# Patient Record
Sex: MALE | Race: OTHER | Employment: OTHER | URBAN - METROPOLITAN AREA
[De-identification: names, ages, dates, MRNs, and addresses within clinical notes are randomized per-mention and may not be internally consistent; named-entity substitution may affect disease eponyms.]

---

## 2018-01-12 ENCOUNTER — HOSPITAL ENCOUNTER (EMERGENCY)
Age: 63
Discharge: HOME | End: 2018-01-12
Attending: EMERGENCY MEDICINE
Payer: COMMERCIAL

## 2018-01-12 ENCOUNTER — APPOINTMENT (OUTPATIENT)
Dept: GENERAL RADIOLOGY | Age: 63
End: 2018-01-12
Attending: EMERGENCY MEDICINE
Payer: COMMERCIAL

## 2018-01-12 VITALS
WEIGHT: 202 LBS | BODY MASS INDEX: 30.62 KG/M2 | HEART RATE: 59 BPM | DIASTOLIC BLOOD PRESSURE: 67 MMHG | TEMPERATURE: 98 F | SYSTOLIC BLOOD PRESSURE: 152 MMHG | OXYGEN SATURATION: 97 % | RESPIRATION RATE: 18 BRPM | HEIGHT: 68 IN

## 2018-01-12 DIAGNOSIS — R07.89 CHEST WALL PAIN: Primary | ICD-10-CM

## 2018-01-12 LAB
ALBUMIN SERPL-MCNC: 3.9 G/DL (ref 3.5–4.8)
ALP LIVER SERPL-CCNC: 75 U/L (ref 45–117)
ALT SERPL-CCNC: 39 U/L (ref 17–63)
AST SERPL-CCNC: 20 U/L (ref 15–41)
ATRIAL RATE: 69 BPM
BASOPHILS # BLD AUTO: 0.03 X10(3) UL (ref 0–0.1)
BASOPHILS NFR BLD AUTO: 0.5 %
BILIRUB SERPL-MCNC: 0.4 MG/DL (ref 0.1–2)
BUN BLD-MCNC: 10 MG/DL (ref 8–20)
CALCIUM BLD-MCNC: 9 MG/DL (ref 8.3–10.3)
CHLORIDE: 104 MMOL/L (ref 101–111)
CO2: 29 MMOL/L (ref 22–32)
CREAT BLD-MCNC: 1.05 MG/DL (ref 0.7–1.3)
D-DIMER: 0.36 UG/ML FEU (ref 0–0.49)
EOSINOPHIL # BLD AUTO: 0.06 X10(3) UL (ref 0–0.3)
EOSINOPHIL NFR BLD AUTO: 0.9 %
ERYTHROCYTE [DISTWIDTH] IN BLOOD BY AUTOMATED COUNT: 13.7 % (ref 11.5–16)
GLUCOSE BLD-MCNC: 185 MG/DL (ref 70–99)
HCT VFR BLD AUTO: 41.6 % (ref 37–53)
HGB BLD-MCNC: 14.3 G/DL (ref 13–17)
IMMATURE GRANULOCYTE COUNT: 0.02 X10(3) UL (ref 0–1)
IMMATURE GRANULOCYTE RATIO %: 0.3 %
LYMPHOCYTES # BLD AUTO: 1.72 X10(3) UL (ref 0.9–4)
LYMPHOCYTES NFR BLD AUTO: 26.5 %
M PROTEIN MFR SERPL ELPH: 7.8 G/DL (ref 6.1–8.3)
MCH RBC QN AUTO: 32.1 PG (ref 27–33.2)
MCHC RBC AUTO-ENTMCNC: 34.4 G/DL (ref 31–37)
MCV RBC AUTO: 93.5 FL (ref 80–99)
MONOCYTES # BLD AUTO: 0.45 X10(3) UL (ref 0.1–0.6)
MONOCYTES NFR BLD AUTO: 6.9 %
NEUTROPHIL ABS PRELIM: 4.21 X10 (3) UL (ref 1.3–6.7)
NEUTROPHILS # BLD AUTO: 4.21 X10(3) UL (ref 1.3–6.7)
NEUTROPHILS NFR BLD AUTO: 64.9 %
P AXIS: 38 DEGREES
P-R INTERVAL: 214 MS
PLATELET # BLD AUTO: 144 10(3)UL (ref 150–450)
POTASSIUM SERPL-SCNC: 3.8 MMOL/L (ref 3.6–5.1)
Q-T INTERVAL: 380 MS
QRS DURATION: 94 MS
QTC CALCULATION (BEZET): 407 MS
R AXIS: -36 DEGREES
RBC # BLD AUTO: 4.45 X10(6)UL (ref 4.3–5.7)
RED CELL DISTRIBUTION WIDTH-SD: 46.8 FL (ref 35.1–46.3)
SODIUM SERPL-SCNC: 138 MMOL/L (ref 136–144)
T AXIS: 2 DEGREES
TROPONIN: <0.046 NG/ML (ref ?–0.05)
VENTRICULAR RATE: 69 BPM
WBC # BLD AUTO: 6.5 X10(3) UL (ref 4–13)

## 2018-01-12 PROCEDURE — 93005 ELECTROCARDIOGRAM TRACING: CPT

## 2018-01-12 PROCEDURE — 85378 FIBRIN DEGRADE SEMIQUANT: CPT | Performed by: EMERGENCY MEDICINE

## 2018-01-12 PROCEDURE — 36415 COLL VENOUS BLD VENIPUNCTURE: CPT

## 2018-01-12 PROCEDURE — 93010 ELECTROCARDIOGRAM REPORT: CPT

## 2018-01-12 PROCEDURE — 99285 EMERGENCY DEPT VISIT HI MDM: CPT

## 2018-01-12 PROCEDURE — 71045 X-RAY EXAM CHEST 1 VIEW: CPT | Performed by: EMERGENCY MEDICINE

## 2018-01-12 PROCEDURE — 84484 ASSAY OF TROPONIN QUANT: CPT | Performed by: EMERGENCY MEDICINE

## 2018-01-12 PROCEDURE — 80053 COMPREHEN METABOLIC PANEL: CPT | Performed by: EMERGENCY MEDICINE

## 2018-01-12 PROCEDURE — 85025 COMPLETE CBC W/AUTO DIFF WBC: CPT | Performed by: EMERGENCY MEDICINE

## 2018-01-12 RX ORDER — ATENOLOL 50 MG/1
50 TABLET ORAL 2 TIMES DAILY
COMMUNITY

## 2018-01-12 RX ORDER — ROSUVASTATIN CALCIUM 10 MG/1
10 TABLET, COATED ORAL NIGHTLY
COMMUNITY

## 2018-01-12 RX ORDER — ESOMEPRAZOLE MAGNESIUM 40 MG/1
40 CAPSULE, DELAYED RELEASE ORAL
COMMUNITY

## 2018-01-12 RX ORDER — LISINOPRIL 20 MG/1
20 TABLET ORAL DAILY
COMMUNITY

## 2018-01-12 RX ORDER — BACLOFEN 10 MG/1
10 TABLET ORAL 2 TIMES DAILY
COMMUNITY

## 2018-01-12 RX ORDER — DICLOFENAC POTASSIUM 50 MG/1
50 TABLET, FILM COATED ORAL 2 TIMES DAILY
COMMUNITY

## 2018-01-12 NOTE — ED PROVIDER NOTES
Patient Seen in: [facility] Emergency Department In [facility]    History   Patient presents with:  Chest Pain Angina (cardiovascular)    Stated Complaint: chest pain    HPI    This is a pleasant 15-year-old male who presents to the ER complaining of having BMI 30.71 kg/m²         Physical Exam  General: This a pleasant nontoxic appearing patient in no apparent distress alert and oriented ×3  HEENT: Pupils are equal reactive to light. Extra ocular motions are intact.   No scleral icterus or conjunctival pallo ---------                               -----------         ------                     CBC W/ DIFFERENTIAL[[id number]]          Abnormal            Final result                 Please view results for these tests on the individual orders.    RAIN pm    Follow-up:  German Key MD  [address] [place] [address] [address] [address]  [phone]    Schedule an appointment as soon as possible for a visit in 3 days          Medications Prescribed:  Current Discharge Medication List

## 2019-09-07 ENCOUNTER — HOSPITAL ENCOUNTER (EMERGENCY)
Age: 64
Discharge: HOME | End: 2019-09-08
Attending: EMERGENCY MEDICINE
Payer: MEDICARE

## 2019-09-07 ENCOUNTER — APPOINTMENT (OUTPATIENT)
Dept: CT IMAGING | Age: 64
End: 2019-09-07
Attending: EMERGENCY MEDICINE
Payer: MEDICARE

## 2019-09-07 ENCOUNTER — APPOINTMENT (OUTPATIENT)
Dept: GENERAL RADIOLOGY | Age: 64
End: 2019-09-07
Payer: MEDICARE

## 2019-09-07 VITALS
OXYGEN SATURATION: 99 % | HEART RATE: 60 BPM | SYSTOLIC BLOOD PRESSURE: 138 MMHG | WEIGHT: 204 LBS | HEIGHT: 68 IN | TEMPERATURE: 98 F | DIASTOLIC BLOOD PRESSURE: 66 MMHG | RESPIRATION RATE: 18 BRPM | BODY MASS INDEX: 30.92 KG/M2

## 2019-09-07 DIAGNOSIS — R07.89 CHEST PAIN, ATYPICAL: Primary | ICD-10-CM

## 2019-09-07 DIAGNOSIS — R16.0 LIVER MASS: ICD-10-CM

## 2019-09-07 LAB
ALBUMIN SERPL-MCNC: 4 G/DL (ref 3.4–5)
ALBUMIN/GLOB SERPL: 1.2 {RATIO} (ref 1–2)
ALP LIVER SERPL-CCNC: 66 U/L (ref 45–117)
ALT SERPL-CCNC: 34 U/L (ref 16–61)
ANION GAP SERPL CALC-SCNC: 7 MMOL/L (ref 0–18)
APTT PPP: 28.9 SECONDS (ref 25.4–36.1)
AST SERPL-CCNC: 17 U/L (ref 15–37)
BASOPHILS # BLD AUTO: 0.02 X10(3) UL (ref 0–0.2)
BASOPHILS NFR BLD AUTO: 0.3 %
BILIRUB SERPL-MCNC: 0.3 MG/DL (ref 0.1–2)
BUN BLD-MCNC: 17 MG/DL (ref 7–18)
BUN/CREAT SERPL: 14.9 (ref 10–20)
CALCIUM BLD-MCNC: 8.8 MG/DL (ref 8.5–10.1)
CHLORIDE SERPL-SCNC: 107 MMOL/L (ref 98–112)
CO2 SERPL-SCNC: 25 MMOL/L (ref 21–32)
CREAT BLD-MCNC: 1.14 MG/DL (ref 0.7–1.3)
D-DIMER: 0.33 UG/ML FEU (ref ?–0.65)
DEPRECATED RDW RBC AUTO: 48.2 FL (ref 35.1–46.3)
EOSINOPHIL # BLD AUTO: 0.1 X10(3) UL (ref 0–0.7)
EOSINOPHIL NFR BLD AUTO: 1.5 %
ERYTHROCYTE [DISTWIDTH] IN BLOOD BY AUTOMATED COUNT: 14 % (ref 11–15)
GLOBULIN PLAS-MCNC: 3.4 G/DL (ref 2.8–4.4)
GLUCOSE BLD-MCNC: 111 MG/DL (ref 70–99)
HCT VFR BLD AUTO: 38.2 % (ref 39–53)
HGB BLD-MCNC: 13.1 G/DL (ref 13–17.5)
IMM GRANULOCYTES # BLD AUTO: 0.02 X10(3) UL (ref 0–1)
IMM GRANULOCYTES NFR BLD: 0.3 %
INR BLD: 1 (ref 0.9–1.1)
LYMPHOCYTES # BLD AUTO: 2.45 X10(3) UL (ref 1–4)
LYMPHOCYTES NFR BLD AUTO: 36.7 %
M PROTEIN MFR SERPL ELPH: 7.4 G/DL (ref 6.4–8.2)
MCH RBC QN AUTO: 32 PG (ref 26–34)
MCHC RBC AUTO-ENTMCNC: 34.3 G/DL (ref 31–37)
MCV RBC AUTO: 93.4 FL (ref 80–100)
MONOCYTES # BLD AUTO: 0.58 X10(3) UL (ref 0.1–1)
MONOCYTES NFR BLD AUTO: 8.7 %
NEUTROPHILS # BLD AUTO: 3.51 X10 (3) UL (ref 1.5–7.7)
NEUTROPHILS # BLD AUTO: 3.51 X10(3) UL (ref 1.5–7.7)
NEUTROPHILS NFR BLD AUTO: 52.5 %
OSMOLALITY SERPL CALC.SUM OF ELEC: 290 MOSM/KG (ref 275–295)
PLATELET # BLD AUTO: 138 10(3)UL (ref 150–450)
POTASSIUM SERPL-SCNC: 4.1 MMOL/L (ref 3.5–5.1)
PSA SERPL DL<=0.01 NG/ML-MCNC: 13.3 SECONDS (ref 12.2–14.4)
RBC # BLD AUTO: 4.09 X10(6)UL (ref 3.8–5.8)
SODIUM SERPL-SCNC: 139 MMOL/L (ref 136–145)
TROPONIN I SERPL-MCNC: <0.045 NG/ML (ref ?–0.04)
WBC # BLD AUTO: 6.7 X10(3) UL (ref 4–11)

## 2019-09-07 PROCEDURE — 85025 COMPLETE CBC W/AUTO DIFF WBC: CPT | Performed by: EMERGENCY MEDICINE

## 2019-09-07 PROCEDURE — 93005 ELECTROCARDIOGRAM TRACING: CPT

## 2019-09-07 PROCEDURE — 96360 HYDRATION IV INFUSION INIT: CPT

## 2019-09-07 PROCEDURE — 96361 HYDRATE IV INFUSION ADD-ON: CPT

## 2019-09-07 PROCEDURE — 71045 X-RAY EXAM CHEST 1 VIEW: CPT | Performed by: EMERGENCY MEDICINE

## 2019-09-07 PROCEDURE — 85730 THROMBOPLASTIN TIME PARTIAL: CPT | Performed by: EMERGENCY MEDICINE

## 2019-09-07 PROCEDURE — 85610 PROTHROMBIN TIME: CPT | Performed by: EMERGENCY MEDICINE

## 2019-09-07 PROCEDURE — 84484 ASSAY OF TROPONIN QUANT: CPT | Performed by: EMERGENCY MEDICINE

## 2019-09-07 PROCEDURE — 99285 EMERGENCY DEPT VISIT HI MDM: CPT

## 2019-09-07 PROCEDURE — 71275 CT ANGIOGRAPHY CHEST: CPT | Performed by: EMERGENCY MEDICINE

## 2019-09-07 PROCEDURE — 93010 ELECTROCARDIOGRAM REPORT: CPT

## 2019-09-07 PROCEDURE — 80053 COMPREHEN METABOLIC PANEL: CPT

## 2019-09-07 PROCEDURE — 84484 ASSAY OF TROPONIN QUANT: CPT

## 2019-09-07 PROCEDURE — 85025 COMPLETE CBC W/AUTO DIFF WBC: CPT

## 2019-09-07 PROCEDURE — 80053 COMPREHEN METABOLIC PANEL: CPT | Performed by: EMERGENCY MEDICINE

## 2019-09-07 PROCEDURE — 85379 FIBRIN DEGRADATION QUANT: CPT | Performed by: EMERGENCY MEDICINE

## 2019-09-07 RX ORDER — SODIUM CHLORIDE 9 MG/ML
INJECTION, SOLUTION INTRAVENOUS CONTINUOUS
Status: DISCONTINUED | OUTPATIENT
Start: 2019-09-07 | End: 2019-09-08

## 2019-09-07 RX ORDER — ALPRAZOLAM 0.25 MG/1
0.25 TABLET ORAL 3 TIMES DAILY PRN
Qty: 20 TABLET | Refills: 0 | Status: SHIPPED | OUTPATIENT
Start: 2019-09-07 | End: 2019-09-14

## 2019-09-07 RX ORDER — ASPIRIN 81 MG/1
324 TABLET, CHEWABLE ORAL ONCE
Status: DISCONTINUED | OUTPATIENT
Start: 2019-09-07 | End: 2019-09-08

## 2019-09-08 LAB
ATRIAL RATE: 57 BPM
P AXIS: 33 DEGREES
P-R INTERVAL: 230 MS
Q-T INTERVAL: 448 MS
QRS DURATION: 96 MS
QTC CALCULATION (BEZET): 436 MS
R AXIS: -34 DEGREES
T AXIS: 4 DEGREES
VENTRICULAR RATE: 57 BPM

## 2019-09-08 NOTE — ED PROVIDER NOTES
Patient Seen in: [facility] Emergency Department In [place]    History   Patient presents with:  Chest Pain Angina (cardiovascular)    Stated Complaint: chest pain off/on since this morning    HPI    Complains of intermittent momentary sharp pains to the or pallor. No rashes. HEENT: No scleral icterus. Oropharynx moist  Neck: Supple without adenopathy. Thyroid not palpable. Carotid arteries 2+ and equal bilaterally without bruits  Lungs: Clear  Chest: No tenderness. No rashes.   Heart: Apical pulse 60 changes. Ct Angiography, Chest (cpt=71275)    Result Date: 9/7/2019  CONCLUSION:  1. No pulmonary embolism or acute chest pathology.  2. Posterior right hepatic mass is not completely evaluated on this noncontrast CT chest.  Recommend additional imaging

## 2019-09-08 NOTE — ED INITIAL ASSESSMENT (HPI)
Pt c/o intermittent \"sharp\" pains to the left side of his chest since this morning. No cortney or nausea. No recent travel or hx of PE's.

## 2024-04-16 ENCOUNTER — APPOINTMENT (OUTPATIENT)
Dept: URBAN - METROPOLITAN AREA CLINIC 247 | Age: 69
Setting detail: DERMATOLOGY
End: 2024-04-17

## 2024-04-16 DIAGNOSIS — L98.8 OTHER SPECIFIED DISORDERS OF THE SKIN AND SUBCUTANEOUS TISSUE: ICD-10-CM

## 2024-04-16 DIAGNOSIS — L82.0 INFLAMED SEBORRHEIC KERATOSIS: ICD-10-CM

## 2024-04-16 PROCEDURE — 99202 OFFICE O/P NEW SF 15 MIN: CPT | Mod: 25

## 2024-04-16 PROCEDURE — 17110 DESTRUCT B9 LESION 1-14: CPT

## 2024-04-16 PROCEDURE — OTHER LIQUID NITROGEN: OTHER

## 2024-04-16 PROCEDURE — OTHER COUNSELING: OTHER

## 2024-04-16 PROCEDURE — OTHER DEFER: OTHER

## 2024-04-16 ASSESSMENT — LOCATION DETAILED DESCRIPTION DERM
LOCATION DETAILED: LEFT SUPERIOR FRONTAL SCALP
LOCATION DETAILED: RIGHT SUPERIOR OCCIPITAL SCALP

## 2024-04-16 ASSESSMENT — LOCATION SIMPLE DESCRIPTION DERM
LOCATION SIMPLE: SCALP
LOCATION SIMPLE: RIGHT OCCIPITAL SCALP

## 2024-04-16 ASSESSMENT — LOCATION ZONE DERM: LOCATION ZONE: SCALP

## 2024-04-16 NOTE — PROCEDURE: LIQUID NITROGEN
Include Z78.9 (Other Specified Conditions Influencing Health Status) As An Associated Diagnosis?: No
Show Aperture Variable?: Yes
Post-Care Instructions: I reviewed with the patient in detail post-care instructions. Patient is to wear sunprotection, and avoid picking at any of the treated lesions. Pt may apply Vaseline to crusted or scabbing areas.
Medical Necessity Clause: This procedure was medically necessary because the lesions that were treated were:
Spray Paint Text: The liquid nitrogen was applied to the skin utilizing a spray paint frosting technique.
Medical Necessity Information: It is in your best interest to select a reason for this procedure from the list below. All of these items fulfill various CMS LCD requirements except the new and changing color options.
Detail Level: Detailed
Consent: The patient's consent was obtained including but not limited to risks of crusting, scabbing, blistering, scarring, darker or lighter pigmentary change, recurrence, incomplete removal and infection.

## 2024-04-16 NOTE — PROCEDURE: DEFER
Introduction Text (Please End With A Colon): The following procedure was deferred: incision and drainage
Detail Level: Simple
Reason To Defer Override: pt will think about removal and call if interested for removal (excision with surgeon)
Size Of Lesion In Cm (Optional): 0.7
X Size Of Lesion In Cm (Optional): 0

## 2025-02-11 ENCOUNTER — HOSPITAL ENCOUNTER (EMERGENCY)
Age: 70
Discharge: HOME | End: 2025-02-11
Attending: EMERGENCY MEDICINE
Payer: MEDICARE

## 2025-02-11 VITALS
HEART RATE: 64 BPM | RESPIRATION RATE: 18 BRPM | SYSTOLIC BLOOD PRESSURE: 120 MMHG | TEMPERATURE: 98 F | OXYGEN SATURATION: 99 % | DIASTOLIC BLOOD PRESSURE: 74 MMHG | HEIGHT: 68 IN | BODY MASS INDEX: 29.86 KG/M2 | WEIGHT: 197 LBS

## 2025-02-11 DIAGNOSIS — R33.9 URINARY RETENTION: Primary | ICD-10-CM

## 2025-02-11 LAB
BILIRUB UR QL STRIP.AUTO: NEGATIVE
CLARITY UR REFRACT.AUTO: CLEAR
COLOR UR AUTO: YELLOW
GLUCOSE UR STRIP.AUTO-MCNC: NEGATIVE MG/DL
KETONES UR STRIP.AUTO-MCNC: NEGATIVE MG/DL
LEUKOCYTE ESTERASE UR QL STRIP.AUTO: NEGATIVE
NITRITE UR QL STRIP.AUTO: NEGATIVE
PH UR STRIP.AUTO: 5.5 [PH] (ref 5–8)
PROT UR STRIP.AUTO-MCNC: NEGATIVE MG/DL
SP GR UR STRIP.AUTO: 1.02 (ref 1–1.03)
UROBILINOGEN UR STRIP.AUTO-MCNC: 0.2 MG/DL

## 2025-02-11 PROCEDURE — 81001 URINALYSIS AUTO W/SCOPE: CPT | Performed by: EMERGENCY MEDICINE

## 2025-02-11 PROCEDURE — 51702 INSERT TEMP BLADDER CATH: CPT

## 2025-02-11 PROCEDURE — 81015 MICROSCOPIC EXAM OF URINE: CPT | Performed by: EMERGENCY MEDICINE

## 2025-02-11 PROCEDURE — 99283 EMERGENCY DEPT VISIT LOW MDM: CPT

## 2025-02-11 PROCEDURE — 99284 EMERGENCY DEPT VISIT MOD MDM: CPT

## 2025-02-11 RX ORDER — METFORMIN HYDROCHLORIDE EXTENDED-RELEASE TABLETS 500 MG/1
500 TABLET, FILM COATED, EXTENDED RELEASE ORAL
COMMUNITY

## 2025-02-11 RX ORDER — LIDOCAINE HYDROCHLORIDE 20 MG/ML
5 JELLY TOPICAL ONCE
Status: COMPLETED | OUTPATIENT
Start: 2025-02-11 | End: 2025-02-11

## 2025-02-11 RX ORDER — TAMSULOSIN HYDROCHLORIDE 0.4 MG/1
0.4 CAPSULE ORAL DAILY
Qty: 7 CAPSULE | Refills: 0 | Status: SHIPPED | OUTPATIENT
Start: 2025-02-11 | End: 2025-02-14

## 2025-02-11 NOTE — ED PROVIDER NOTES
Patient Seen in: [facility] In [place]      History     Chief Complaint   Patient presents with    Urinary Symptoms     Stated Complaint: urinary retention for 12 hours    Subjective:   HPI      69-year-old with a history of GERD, hypertension, high cholesterol, prostatitis in  presents with his daughter for evaluation of urinary retention.  Some of the history is provided by his daughter.  Patient states that he has not urinated since 1 PM yesterday.  Feels uncomfortable now.  Did not have abdominal pain or flank pain preceding this.  No hematuria dysuria urgency or frequency.  No fever.  No nausea or vomiting.  No midline back pain or weakness or numbness of the lower extremities.  Denies new medications or recent surgery.  Outpatient records reviewed.  Hemoglobin A1c was 6.3 in January.  Objective:     Past Medical History:    Esophageal reflux    Essential hypertension    Hyperlipidemia              History reviewed. No pertinent surgical history.             Social History     Socioeconomic History    Marital status:    Tobacco Use    Smoking status: Never    Smokeless tobacco: Never   Vaping Use    Vaping status: Never Used   Substance and Sexual Activity    Alcohol use: Never    Drug use: Never     Social Drivers of Health     Food Insecurity: Not at Risk (2024)    Received from [facility]    Food Insecurity     Food: 1   Transportation Needs: Not at Risk (2025)    Received from [facility]    Transportation Needs     Transportation: 1   Housing Stability: Not at Risk (2025)    Received from [facility]    Housing Stability     Housin                  Physical Exam     ED Triage Vitals [25 0731]   /79   Pulse 66   Resp 18   Temp 97.9 °F (36.6 °C)   Temp src Temporal   SpO2 98 %   O2 Device None (Room air)       Current Vitals:   Vital Signs  BP: 157/79  Pulse: 66  Resp: 18  Temp: 97.9 °F (36.6 °C)  Temp src: Temporal    Oxygen Therapy  SpO2: 98 %  O2 Device: None  (Room air)        Physical Exam  General: Patient is awake, alert in no acute distress.   HEENT:  Sclera are not icteric.  Conjunctivae within normal limits.  Mucous members are moist.   Cardiovascular: Regular rate and rhythm, normal S1-S2.  Respiratory: Lungs are clear to auscultation bilaterally.   Abdomen: Soft, tender over a distended bladder in the lower abdomen, nondistended.  Extremities: No edema.  Skin: warm and dry, no diaphoresis  Neuro: Normal strength and sensation bilateral lower extremities  ED Course     Labs Reviewed   URINALYSIS WITH CULTURE REFLEX - Abnormal; Notable for the following components:       Result Value    Blood Urine Trace-lysed (*)     All other components within normal limits   UA MICROSCOPIC ONLY, URINE - Abnormal; Notable for the following components:    RBC Urine 3-5 (*)     All other components within normal limits            Chan catheter placed  1200cc clear urine drained     MDM      History is obtained from: Daughter    Previous records reviewed as noted in HPI    Differential includes, but is not limited to, pyelonephritis, cauda equina, BPH    Review of any laboratory testing: Urinalysis without evidence of infection     Shared decision making with the patient.  Patient will be discharged with Chan catheter in place.  Nothing to suggest cauda equina or pyelonephritis.  May have underlying BPH can start Flomax while awaiting urology follow-up.  Return precautions given.    The administration of these medications were addressed : Flomax                             Medical Decision Making      Disposition and Plan     Clinical Impression:  1. Urinary retention         Disposition:  Discharge  2/11/2025  8:50 am    Follow-up:  [facility], [address] [place] [id number] [phone]  Schedule an appointment as soon as possible for a visit in 3 day(s)            Medications Prescribed:  Current Discharge  Medication List        START taking these medications    Details   tamsulosin (FLOMAX) 0.4 MG Oral Cap Take 1 capsule (0.4 mg total) by mouth daily for 7 days.  Qty: 7 capsule, Refills: 0                 Supplementary Documentation:

## 2025-02-12 ENCOUNTER — TELEPHONE (OUTPATIENT)
Dept: SURGERY | Facility: CLINIC | Age: 70
End: 2025-02-12

## 2025-02-12 NOTE — TELEPHONE ENCOUNTER
Patient daughter calling stating patient needs to schedule an ER follow up was told to follow up by 2/14  per patient daughter stating he had a catheter inserted yesterday. No openings available would prefer [place] location. Per daughter patient has an HMO insurance but patient will see his primary doctor to get referral.

## 2025-02-13 ENCOUNTER — HOSPITAL ENCOUNTER (EMERGENCY)
Age: 70
Discharge: HOME | End: 2025-02-13
Attending: STUDENT IN AN ORGANIZED HEALTH CARE EDUCATION/TRAINING PROGRAM
Payer: MEDICARE

## 2025-02-13 VITALS
TEMPERATURE: 98 F | OXYGEN SATURATION: 97 % | HEART RATE: 96 BPM | SYSTOLIC BLOOD PRESSURE: 145 MMHG | RESPIRATION RATE: 19 BRPM | DIASTOLIC BLOOD PRESSURE: 80 MMHG

## 2025-02-13 DIAGNOSIS — T83.011A MALFUNCTION OF FOLEY CATHETER, INITIAL ENCOUNTER: Primary | ICD-10-CM

## 2025-02-13 LAB
BILIRUB UR QL STRIP.AUTO: NEGATIVE
CLARITY UR REFRACT.AUTO: CLEAR
COLOR UR AUTO: YELLOW
GLUCOSE UR STRIP.AUTO-MCNC: 500 MG/DL
KETONES UR STRIP.AUTO-MCNC: NEGATIVE MG/DL
LEUKOCYTE ESTERASE UR QL STRIP.AUTO: NEGATIVE
NITRITE UR QL STRIP.AUTO: NEGATIVE
PH UR STRIP.AUTO: 5.5 [PH] (ref 5–8)
RBC #/AREA URNS AUTO: >10 /HPF
RBC #/AREA URNS AUTO: >10 /HPF
SP GR UR STRIP.AUTO: 1.02 (ref 1–1.03)
UROBILINOGEN UR STRIP.AUTO-MCNC: 0.2 MG/DL

## 2025-02-13 PROCEDURE — 99283 EMERGENCY DEPT VISIT LOW MDM: CPT

## 2025-02-13 PROCEDURE — 81015 MICROSCOPIC EXAM OF URINE: CPT | Performed by: STUDENT IN AN ORGANIZED HEALTH CARE EDUCATION/TRAINING PROGRAM

## 2025-02-13 PROCEDURE — 81001 URINALYSIS AUTO W/SCOPE: CPT | Performed by: STUDENT IN AN ORGANIZED HEALTH CARE EDUCATION/TRAINING PROGRAM

## 2025-02-13 RX ORDER — LIDOCAINE HYDROCHLORIDE 20 MG/ML
5 JELLY TOPICAL ONCE
Status: COMPLETED | OUTPATIENT
Start: 2025-02-13 | End: 2025-02-13

## 2025-02-13 NOTE — ED PROVIDER NOTES
History     Chief Complaint   Patient presents with    Cath Tube Problem       HPI    69 year old male presents with Chan catheter malfunction.  Patient was seen in this ER 2/11 with urinary retention, had a Chan catheter placed and started on Flomax.  Patient states since about 3 PM yesterday he has not noted any drainage of urine into the bag.  He is endorsing suprapubic discomfort.  No other acute symptoms.          Past Medical History:    Esophageal reflux    Essential hypertension    Hyperlipidemia       No past surgical history on file.    No pertinent social history.                 Physical Exam     ED Triage Vitals [02/13/25 0308]   /80   Pulse 96   Resp 19   Temp 98.2 °F (36.8 °C)   Temp src Oral   SpO2 97 %   O2 Device None (Room air)       Physical Exam  Constitutional:       General: He is not in acute distress.  Eyes:      Extraocular Movements: Extraocular movements intact.   Cardiovascular:      Rate and Rhythm: Normal rate.      Pulses: Normal pulses.   Pulmonary:      Effort: Pulmonary effort is normal. No respiratory distress.   Abdominal:      General: There is distension.      Tenderness: There is abdominal tenderness.   Genitourinary:     Comments: Chan catheter appears to be in place  Musculoskeletal:      Cervical back: Normal range of motion.   Neurological:      General: No focal deficit present.      Mental Status: He is alert.              ED Course     Labs Reviewed   URINALYSIS WITH CULTURE REFLEX - Abnormal; Notable for the following components:       Result Value    Glucose Urine 500 (*)     Blood Urine Large (*)     Protein Urine 30 mg/dL (*)     RBC Urine >10 (*)     All other components within normal limits   UA MICROSCOPIC ONLY, URINE - Abnormal; Notable for the following components:    RBC Urine >10 (*)     All other components within normal limits     No results found.        MDM     Vitals:    02/13/25 0308   BP: 145/80   Pulse: 96   Resp: 19   Temp: 98.2 °F (36.8  °C)   TempSrc: Oral   SpO2: 97%       Chan catheter obstruction, malfunction.  Possible cystitis or worsening bph.  Attempted flushing Chan catheter, without success.  Will replace Chan catheter      ED Course as of 02/13/25 0432  ------------------------------------------------------------  Time: 02/13 0416  Comment: Chan catheter replaced successfully.  Large volume urine freely flowing and patient is more comfortable.  Patient will be discharged home, continue current therapy.  Unfortunately urinalysis chemistry machine is down at this time, will follow-up urinalysis and call in any necessary antibiotics.     UA without evidence of infx.      Disposition and Plan     Clinical Impression:  1. Malfunction of Chan catheter, initial encounter        Disposition:  Discharge    Follow-up:  urology    Follow up        Medications Prescribed:  Current Discharge Medication List

## 2025-02-14 ENCOUNTER — OFFICE VISIT (OUTPATIENT)
Dept: SURGERY | Facility: CLINIC | Age: 70
End: 2025-02-14

## 2025-02-14 DIAGNOSIS — R97.20 ELEVATED PSA: ICD-10-CM

## 2025-02-14 DIAGNOSIS — N40.1 BENIGN LOCALIZED PROSTATIC HYPERPLASIA WITH LOWER URINARY TRACT SYMPTOMS (LUTS): Primary | ICD-10-CM

## 2025-02-14 DIAGNOSIS — R33.9 URINARY RETENTION: ICD-10-CM

## 2025-02-14 PROCEDURE — 1160F RVW MEDS BY RX/DR IN RCRD: CPT | Performed by: UROLOGY

## 2025-02-14 PROCEDURE — 1159F MED LIST DOCD IN RCRD: CPT | Performed by: UROLOGY

## 2025-02-14 PROCEDURE — 99204 OFFICE O/P NEW MOD 45 MIN: CPT | Performed by: UROLOGY

## 2025-02-14 RX ORDER — TAMSULOSIN HYDROCHLORIDE 0.4 MG/1
0.4 CAPSULE ORAL DAILY
Qty: 30 CAPSULE | Refills: 5 | Status: SHIPPED | OUTPATIENT
Start: 2025-02-14 | End: 2025-08-13

## 2025-02-14 RX ORDER — TAMSULOSIN HYDROCHLORIDE 0.4 MG/1
0.4 CAPSULE ORAL DAILY
Qty: 30 CAPSULE | Refills: 0 | Status: SHIPPED | OUTPATIENT
Start: 2025-02-14 | End: 2025-02-14

## 2025-02-14 NOTE — PROGRESS NOTES
Urology Clinic Note - New Patient    Referring Provider:  No referring provider defined for this encounter.     Primary Care Provider:  Rosie Salgado APRN     Chief Complaint:   Urinary retention     HPI:     Dean Domnigo is a 69 year old male with history of GERD, hypertension, CAD on aspirin 81 only (error in chart), hyperlipidemia referred for urinary retention.   offered, patient did not want.    Patient has no urologic history aside from possible prostatitis in 2013.  He presented to the emergency room on 2/11/2025 with urinary retention for 1 day.  He had abdominal pain at that time.  He had a catheter placed.  He apparently had over 1 L drained.  Urine was not consistent with infection but did have some microscopic hematuria.  No imaging was done at that time, he was discharged with catheter.  He now presents for evaluation.  Recent labs reviewed, recent A1c was 6.3.  Patient has been on Flomax since discharge.  Of note, he was in the emergency room yesterday morning for catheter malfunction.  Catheter was unable to be flushed and was replaced.  Has been draining fine since that time.  At baseline, patient reports he reports slightly slowed urinary stream over the last few years.  Again, has never previously seen urology.  He is currently tolerating Chan fine.  His bowels are regular.  No previous history of retention.  No recent medication changes.  No recent travel.  He was given Flomax for 1 week.  Has a few tablets left.    PSA was done at OSH, 4/29/24- 5.3.       PSA:  No results found for: \"PSA\", \"PERCENTPSA\", \"PSAS\", \"PSAULTRA\", \"QPSA\", \"PSATOT\", \"TOTPSADX\", \"TOTPSASCREEN\"   Last Cr was 1.14 done on 9/7/2019.  Last GFR (PEDRO) was 67 done on 9/7/2019.      History:     Past Medical History:    Esophageal reflux    Essential hypertension    Hyperlipidemia       No past surgical history on file.    No family history on file.    Social History     Socioeconomic History    Marital status:     Tobacco Use    Smoking status: Never    Smokeless tobacco: Never   Vaping Use    Vaping status: Never Used   Substance and Sexual Activity    Alcohol use: Never    Drug use: Never     Social Drivers of Health     Food Insecurity: Not at Risk (2024)    Received from [facility]    Food Insecurity     Food: 1   Transportation Needs: Not at Risk (2025)    Received from [facility]    Transportation Needs     Transportation: 1   Stress: Not at Risk (2025)    Received from [facility]    Stress     Stress: 1   Housing Stability: Not at Risk (2025)    Received from [facility]    Housing Stability     Housin       Medications (Active prior to today's visit):  Current Outpatient Medications   Medication Sig Dispense Refill    metFORMIN HCl ER, OSM, 500 MG (OSM) Oral Tablet 24 Hr Take 1 tablet (500 mg total) by mouth daily with breakfast.      tamsulosin (FLOMAX) 0.4 MG Oral Cap Take 1 capsule (0.4 mg total) by mouth daily for 7 days. 7 capsule 0    Diclofenac Potassium 50 MG Oral Tab Take 1 tablet (50 mg total) by mouth 2 (two) times daily.      baclofen 10 MG Oral Tab Take 1 tablet (10 mg total) by mouth 2 (two) times daily.      Esomeprazole Magnesium 40 MG Oral Capsule Delayed Release Take 1 capsule (40 mg total) by mouth every morning before breakfast.      aspirin 325 MG Oral Tab EC Take 1 tablet (325 mg total) by mouth every 6 (six) hours as needed for Pain.      atenolol 50 MG Oral Tab Take 1 tablet (50 mg total) by mouth 2 (two) times daily.      Rosuvastatin Calcium 10 MG Oral Tab Take 1 tablet (10 mg total) by mouth nightly.      lisinopril 20 MG Oral Tab Take 1 tablet (20 mg total) by mouth daily.         Allergies:  Allergies[1]      Review of Systems:   A comprehensive 10-point review of systems was completed.  Pertinent positives and negatives are noted in the the HPI.    Physical Exam:   CONSTITUTIONAL: Well developed, well nourished, in no acute distress  NEUROLOGIC: Alert and oriented  HEAD:  Normocephalic, atraumatic  ENT: Hearing intact   RESPIRATORY: Normal respiratory effort  SKIN: No evident rashes  ABDOMEN: Soft, non-tender, non-distended,  Chan in place with yellow      Assessment & Plan:       Urinary retention  Discussed potential etiologies.  No previous imaging.  No previous urologic workup done.  Will continue Flomax for now.  Given his LUTS that have been developing over the last several years we discussed the option to proceed with cystoscopy and void trial in the same day.  He would like to do this.  Will consider finasteride pending size of prostate.  If unable to void may also double dose of Flomax at that time.  Flomax refilled today  CT stone ordered for prostate sizing, checking for other potential causes of retention    Elevated PSA  Discussed this in detail today.  Patient needs a recheck PSA.  We will wait for his catheter to come out before doing this.  If any concerns in the future or if bladder outlet obstruction procedures plan we may proceed with an MRI before.      Thank you for this consult.    I have personally reviewed all relevant medical records, labs, and imaging.           Hi Costa MD  Staff Urologist  [facility]  Office: [phone]           [1] No Known Allergies

## 2025-02-17 RX ORDER — TAMSULOSIN HYDROCHLORIDE 0.4 MG/1
0.4 CAPSULE ORAL
Qty: 90 CAPSULE | Refills: 0 | OUTPATIENT
Start: 2025-02-17

## 2025-02-20 ENCOUNTER — HOSPITAL ENCOUNTER (EMERGENCY)
Age: 70
Discharge: HOME | End: 2025-02-20
Attending: EMERGENCY MEDICINE
Payer: MEDICARE

## 2025-02-20 ENCOUNTER — PROCEDURE (OUTPATIENT)
Facility: LOCATION | Age: 70
End: 2025-02-20
Payer: MEDICARE

## 2025-02-20 ENCOUNTER — NURSE ONLY (OUTPATIENT)
Facility: LOCATION | Age: 70
End: 2025-02-20
Payer: MEDICARE

## 2025-02-20 VITALS
SYSTOLIC BLOOD PRESSURE: 187 MMHG | TEMPERATURE: 98 F | HEIGHT: 68 IN | OXYGEN SATURATION: 94 % | RESPIRATION RATE: 20 BRPM | BODY MASS INDEX: 29.4 KG/M2 | HEART RATE: 69 BPM | DIASTOLIC BLOOD PRESSURE: 95 MMHG | WEIGHT: 194 LBS

## 2025-02-20 DIAGNOSIS — R33.9 URINARY RETENTION: ICD-10-CM

## 2025-02-20 DIAGNOSIS — R97.20 ELEVATED PSA: ICD-10-CM

## 2025-02-20 DIAGNOSIS — N40.1 BENIGN LOCALIZED PROSTATIC HYPERPLASIA WITH LOWER URINARY TRACT SYMPTOMS (LUTS): Primary | ICD-10-CM

## 2025-02-20 DIAGNOSIS — R33.9 URINARY RETENTION: Primary | ICD-10-CM

## 2025-02-20 LAB
BILIRUB UR QL STRIP.AUTO: NEGATIVE
CLARITY UR REFRACT.AUTO: CLEAR
COLOR UR AUTO: YELLOW
GLUCOSE UR STRIP.AUTO-MCNC: 250 MG/DL
KETONES UR STRIP.AUTO-MCNC: NEGATIVE MG/DL
LEUKOCYTE ESTERASE UR QL STRIP.AUTO: NEGATIVE
NITRITE UR QL STRIP.AUTO: NEGATIVE
PH UR STRIP.AUTO: 5 [PH] (ref 5–8)
PROT UR STRIP.AUTO-MCNC: NEGATIVE MG/DL
SP GR UR STRIP.AUTO: <=1.005 (ref 1–1.03)
UROBILINOGEN UR STRIP.AUTO-MCNC: 0.2 MG/DL

## 2025-02-20 PROCEDURE — 99283 EMERGENCY DEPT VISIT LOW MDM: CPT

## 2025-02-20 PROCEDURE — 81015 MICROSCOPIC EXAM OF URINE: CPT | Performed by: EMERGENCY MEDICINE

## 2025-02-20 PROCEDURE — 52000 CYSTOURETHROSCOPY: CPT | Performed by: UROLOGY

## 2025-02-20 PROCEDURE — 81001 URINALYSIS AUTO W/SCOPE: CPT | Performed by: EMERGENCY MEDICINE

## 2025-02-20 PROCEDURE — 99215 OFFICE O/P EST HI 40 MIN: CPT | Performed by: UROLOGY

## 2025-02-20 PROCEDURE — 51702 INSERT TEMP BLADDER CATH: CPT

## 2025-02-20 RX ORDER — LIDOCAINE HYDROCHLORIDE 20 MG/ML
5 JELLY TOPICAL ONCE
Status: COMPLETED | OUTPATIENT
Start: 2025-02-20 | End: 2025-02-20

## 2025-02-20 RX ORDER — TAMSULOSIN HYDROCHLORIDE 0.4 MG/1
0.8 CAPSULE ORAL EVERY EVENING
Qty: 60 CAPSULE | Refills: 2 | Status: SHIPPED | OUTPATIENT
Start: 2025-02-20 | End: 2025-05-21

## 2025-02-20 RX ORDER — SULFAMETHOXAZOLE AND TRIMETHOPRIM 800; 160 MG/1; MG/1
1 TABLET ORAL ONCE
Status: COMPLETED | OUTPATIENT
Start: 2025-02-20 | End: 2025-02-20

## 2025-02-20 RX ORDER — FINASTERIDE 5 MG/1
5 TABLET, FILM COATED ORAL DAILY
Qty: 90 TABLET | Refills: 6 | Status: SHIPPED | OUTPATIENT
Start: 2025-02-20

## 2025-02-20 RX ADMIN — SULFAMETHOXAZOLE AND TRIMETHOPRIM 1 TABLET: 800; 160 TABLET ORAL at 14:57:00

## 2025-02-20 NOTE — PROGRESS NOTES
Clinic Procedure Note    INDICATIONS:      Dean Domingo is a 69 year old male with history of GERD, hypertension, CAD on aspirin 81 only (error in chart), hyperlipidemia referred for urinary retention.   offered, patient did not want.     Patient has no urologic history aside from possible prostatitis in 2013.  He presented to the emergency room on 2/11/2025 with urinary retention for 1 day.  He had abdominal pain at that time.  He had a catheter placed.  He apparently had over 1 L drained.  Urine was not consistent with infection but did have some microscopic hematuria.  No imaging was done at that time, he was discharged with catheter.    He then saw for evaluation.  Recent labs reviewed, recent A1c was 6.3.  Patient has been on Flomax since discharge.   At baseline, patient reports he reports slightly slowed urinary stream over the last few years.  Again, has never previously seen urology.  He is currently tolerating Chan fine.  His bowels are regular.  No previous history of retention.  No recent medication changes.  No recent travel.      PSA was done at OSH, 4/29/24- 5.3.   At last visit, plan was to recheck a PSA in the future.  Discussed that he may need MRI in the future to work this up.  For his urinary retention, we were to discuss adding finasteride.  A CT scan was ordered.  This was not done.  Flomax was refilled.  He was to return for a void trial and cystoscopy    Catheter was removed this morning.  Patient misunderstood instructions, did not realize that he was returning for a postvoid residual and cystoscopy.  He was unable to void.  He went to the emergency room here at [facility] and a Chan was placed with return of 700 mL of fluid  CT scan has not been done yet is ordered    PROCEDURE:       1. Flexible cystourethroscopy    DATE OF PROCEDURE: 2/20/2025     PRE-PROCEDURE DIAGNOSIS: Urinary retention    POST-PROCEDURE DIAGNOSIS: Same     SURGEON: Hi Costa,  MD    FINDINGS:    Urethra: Orthotopic meatus, normal caliber urethra throughout without lesions    Prostate: Moderate trilobar hypertrophy with obstructive lateral lobes, moderate intravesical median lobe with high bladder neck     Bladder: Normal mucosa with no papillary lesions or erythema, mild trabeculation; mild posterior bladder irritation from Chan catheter    Ureteral orifices: Orthotopic, very close to bladder neck     Other findings: None    PROCEDURE:   Patient was brought to the procedure suite and a time-out was performed identifiying the patient,  and procedure to be performed. The risks and benefits of the procedure were once again discussed with the patient including bleeding, infection, and dysuria. The patient agreed to proceed. The patient did not have any signs or symptoms of active UTI.    He was placed in supine position on the table and the penis was prepped and draped in the standard sterile fashion. Urojet was instilled per urethra for local anesthetic effect. A flexible cystoscope was inserted per urethra. The bladder was fully inspected (including retroflexion) and showed findings as above. Both ureteral orifices were orthotopic. The prostate was carefully viewed on removal of the scope, with findings as above. The scope was then carefully removed.    There were no complications and the patient tolerated the procedure well.    IMPRESSION AND PLAN:     Urinary retention    As above.  Unclear etiology of his retention.  He does have slightly worsening constipation recently.  He did not note this last week but has noticed this week.  I told him to start MiraLAX daily.  He may double the dose of his Flomax.  Given the appearance of his prostate we may start finasteride as well.  Side effects were discussed in detail.  I did discuss possible surgical options.  We should further workup his PSA as below before proceeding with surgery.  Will start medications as above and repeat void trial in  1 month, he should again return to see me at that time to discuss surgery.  I also want to discuss CT scan at that time, this is ordered.  He should complete soon.  Does not want to learn [facility]    Hx of elev PSA  As above.  Given ongoing retention and inability to get Chan out we will plan for repeat PSA in 2 weeks.  If remains elevated we will plan for a MRI of his prostate.  Cystoscopy with medially deviated ureteral orifices and closer to the bladder neck, however no other signs of local extension of prostate cancer    In total, 30 additional minutes were spent on this patient encounter (including chart review, patient history, physical, and counseling, documentation, and communication).  Extensive counseling with both patient and family.  Patient did not want ,         Hi Costa MD  Staff Urologist  [facility]  Office: [phone]

## 2025-02-20 NOTE — ED PROVIDER NOTES
Patient Seen in: [facility] [place]      History     Chief Complaint   Patient presents with    Urinary Symptoms     Stated Complaint: had catheter removed today and having urinary retention    Subjective:   HPI      69-year-old male presents for evaluation of inability to urinate since 9 AM this morning.  Patient was seen this morning by urology and had his catheter pulled.  Previous to that he had had the catheter in place for 8 days for urinary retention.  He was here on .  He is in the process of getting an outpatient workup for urinary retention.    Objective:     Past Medical History:    Esophageal reflux    Essential hypertension    Hyperlipidemia              History reviewed. No pertinent surgical history.             Social History     Socioeconomic History    Marital status:    Tobacco Use    Smoking status: Never    Smokeless tobacco: Never   Vaping Use    Vaping status: Never Used   Substance and Sexual Activity    Alcohol use: Never    Drug use: Never     Social Drivers of Health     Food Insecurity: Not at Risk (2024)    Received from [facility]    Food Insecurity     Food: 1   Transportation Needs: Not at Risk (2025)    Received from [facility]    Transportation Needs     Transportation: 1   Housing Stability: Not at Risk (2025)    Received from [facility]    Housing Stability     Housin                  Physical Exam     ED Triage Vitals [25 1350]   BP (!) 187/95   Pulse 69   Resp 20   Temp 97.9 °F (36.6 °C)   Temp src Oral   SpO2 94 %   O2 Device None (Room air)       Current Vitals:   Vital Signs  BP: (!) 187/95  Pulse: 69  Resp: 20  Temp: 97.9 °F (36.6 °C)  Temp src: Oral    Oxygen Therapy  SpO2: 94 %  O2 Device: None (Room air)        Physical Exam  General: Uncomfortable  Neck: Supple  Lungs: Clear to auscultation bilaterally.  Heart: Regular rate and rhythm.  Abdomen: Soft, nontender.  Suprapubic distention  Skin: No rash.  No edema.  Neurologic: No  focal neurologic deficits.  Normal speech pattern  Musculoskeletal: No tenderness or deformity noted.  Full range of motion throughout.        ED Course     Labs Reviewed   URINALYSIS WITH CULTURE REFLEX                   MDM      Differential diagnosis includes UTI versus urinary retention    UA shows blood but no infection    Chan placed with 700 cc of urine obtained.  Patient has an appointment right now with urology.  He was discharged with a leg bag      Medical Decision Making  Amount and/or Complexity of Data Reviewed  External Data Reviewed: notes.     Details: ER note 2/11 and urology note from earlier today        Disposition and Plan     Clinical Impression:  1. Urinary retention         Disposition:  Discharge  2/20/2025  2:07 pm    Follow-up:  Hi Costa MD  [address] [place] [address]  [phone]    Schedule an appointment as soon as possible for a visit            Medications Prescribed:  Current Discharge Medication List              Supplementary Documentation:

## 2025-02-20 NOTE — PROGRESS NOTES
Verified the patient's name, date of birth and his reason for the visit. Steps of the procedure explained. Safely assisted patient to the exam table and put him in a comfortable position. While on standing position, RN asked to lower his pants and undergarments. Blue chux under his buttocks. Draped the private area.  Penile area observed to be clean and pink. No redness or swelling.     Donned gloves. Removed the straps and the leg bag. Urine was yellow and no sedimentation observed.    Chux on patient's thigh. With the empty syringe, deflated the balloon of his entire content of 10cc. Chan removed slowly encouraging the patient to breath slowly. Leg bag was also removed, disposed it with the chux and syringe. Denies pain.    Assisted patient to comfortable position and helped him pull his pants and undergarments. Washed hands    Provided education and instructions to monitor urination. Instructed Patient to push fluids(40-60 oz water daily) and to return to clinic the if unable to void/bladder distension.  Patient to return this afternoon for PVR and cystoscopy.  Patient agreeable with plan.

## 2025-02-20 NOTE — ED INITIAL ASSESSMENT (HPI)
Pt had catheter removed at 9am today at urologist's office.  Has only had small amount of urine output since.

## 2025-02-28 ENCOUNTER — LAB ENCOUNTER (OUTPATIENT)
Dept: LAB | Age: 70
End: 2025-02-28
Attending: UROLOGY
Payer: MEDICARE

## 2025-02-28 ENCOUNTER — HOSPITAL ENCOUNTER (OUTPATIENT)
Dept: CT IMAGING | Age: 70
Discharge: HOME | End: 2025-02-28
Attending: UROLOGY
Payer: MEDICARE

## 2025-02-28 DIAGNOSIS — R97.20 ELEVATED PSA: ICD-10-CM

## 2025-02-28 DIAGNOSIS — N40.1 BENIGN LOCALIZED PROSTATIC HYPERPLASIA WITH LOWER URINARY TRACT SYMPTOMS (LUTS): ICD-10-CM

## 2025-02-28 DIAGNOSIS — R33.9 URINARY RETENTION: ICD-10-CM

## 2025-02-28 LAB — PSA SERPL-MCNC: 6.15 NG/ML (ref ?–4)

## 2025-02-28 PROCEDURE — 84153 ASSAY OF PSA TOTAL: CPT

## 2025-02-28 PROCEDURE — 74176 CT ABD & PELVIS W/O CONTRAST: CPT | Performed by: UROLOGY

## 2025-02-28 PROCEDURE — 36415 COLL VENOUS BLD VENIPUNCTURE: CPT

## 2025-03-02 DIAGNOSIS — R97.20 ELEVATED PSA: Primary | ICD-10-CM

## 2025-03-03 ENCOUNTER — TELEPHONE (OUTPATIENT)
Dept: SURGERY | Facility: CLINIC | Age: 70
End: 2025-03-03

## 2025-03-03 ENCOUNTER — HOSPITAL ENCOUNTER (EMERGENCY)
Age: 70
Discharge: HOME | End: 2025-03-04
Attending: EMERGENCY MEDICINE
Payer: MEDICARE

## 2025-03-03 DIAGNOSIS — N39.0 ACUTE UTI: ICD-10-CM

## 2025-03-03 DIAGNOSIS — T83.011A MALFUNCTION OF FOLEY CATHETER, INITIAL ENCOUNTER: Primary | ICD-10-CM

## 2025-03-03 LAB
BILIRUB UR QL CFM: NEGATIVE
COLOR UR AUTO: YELLOW
GLUCOSE UR STRIP.AUTO-MCNC: NEGATIVE MG/DL
NITRITE UR QL STRIP.AUTO: POSITIVE
PH UR STRIP.AUTO: 5.5 [PH] (ref 5–8)
PROT UR STRIP.AUTO-MCNC: >=300 MG/DL
SP GR UR STRIP.AUTO: >=1.03 (ref 1–1.03)
UROBILINOGEN UR STRIP.AUTO-MCNC: 2 MG/DL

## 2025-03-03 PROCEDURE — 81015 MICROSCOPIC EXAM OF URINE: CPT | Performed by: EMERGENCY MEDICINE

## 2025-03-03 PROCEDURE — 87086 URINE CULTURE/COLONY COUNT: CPT | Performed by: EMERGENCY MEDICINE

## 2025-03-03 PROCEDURE — 51702 INSERT TEMP BLADDER CATH: CPT

## 2025-03-03 PROCEDURE — 87077 CULTURE AEROBIC IDENTIFY: CPT | Performed by: EMERGENCY MEDICINE

## 2025-03-03 PROCEDURE — 99284 EMERGENCY DEPT VISIT MOD MDM: CPT

## 2025-03-03 PROCEDURE — 81001 URINALYSIS AUTO W/SCOPE: CPT | Performed by: EMERGENCY MEDICINE

## 2025-03-03 PROCEDURE — 87186 SC STD MICRODIL/AGAR DIL: CPT | Performed by: EMERGENCY MEDICINE

## 2025-03-03 PROCEDURE — 51798 US URINE CAPACITY MEASURE: CPT

## 2025-03-03 NOTE — TELEPHONE ENCOUNTER
Per daughter returning call, states patient would like to wait a couple more days. Please advise thank you.

## 2025-03-03 NOTE — TELEPHONE ENCOUNTER
Daughter called, patient is urinating out of cath and has burning sensation.   Call dropped while attempting to reach the nurse.

## 2025-03-03 NOTE — TELEPHONE ENCOUNTER
Phoned Patient Daughter to discuss below.  Patient Daughter Rima reports that patient is leaking urine from around the catheter site with some burning.  RN instructed Patient to come to Urology for catheter change.  Patient daughter to call back if this scheduling is possible for the patient.

## 2025-03-03 NOTE — TELEPHONE ENCOUNTER
Phoned Patient Daughter Rima to discuss below  No answer, left voice message to call RN back @[phone]

## 2025-03-04 ENCOUNTER — TELEPHONE (OUTPATIENT)
Dept: SURGERY | Facility: CLINIC | Age: 70
End: 2025-03-04

## 2025-03-04 VITALS
WEIGHT: 194 LBS | HEART RATE: 68 BPM | HEIGHT: 68 IN | RESPIRATION RATE: 16 BRPM | SYSTOLIC BLOOD PRESSURE: 153 MMHG | OXYGEN SATURATION: 94 % | BODY MASS INDEX: 29.4 KG/M2 | TEMPERATURE: 98 F | DIASTOLIC BLOOD PRESSURE: 82 MMHG

## 2025-03-04 RX ORDER — LIDOCAINE HYDROCHLORIDE 20 MG/ML
5 JELLY TOPICAL ONCE
Status: COMPLETED | OUTPATIENT
Start: 2025-03-04 | End: 2025-03-04

## 2025-03-04 RX ORDER — CEPHALEXIN 500 MG/1
500 CAPSULE ORAL 4 TIMES DAILY
Qty: 40 CAPSULE | Refills: 0 | Status: SHIPPED | OUTPATIENT
Start: 2025-03-04 | End: 2025-03-14

## 2025-03-04 NOTE — TELEPHONE ENCOUNTER
RN notified Patient's daughter that Dr. Costa advised that Pt's  PSA remains elevated and is recommending scheduling a MRI prior to his OV 3/13/25.  RN relayed the message to Patient's Daughter that prostate is enlarged and this could cause his urinary  retention.  Patient's daughter agreeable with plan states she will schedule the MRI and will be with Patient 3/13/25 for follow-up visit.  This Encounter is now closed.

## 2025-03-04 NOTE — TELEPHONE ENCOUNTER
----- Message from Glendy VALDOVINOS sent at 3/3/2025 10:31 AM CST -----    ----- Message -----  From: Hi Costa MD  Sent: 3/2/2025   9:45 AM CST  To: [facility] ; #    Please call patient and let him know PSA remains high, I have therefore ordered an MRI to further assess this. They should schedule.   Please let him also know that his prostate is very enlarged (3-5x larger than normal). This is likely why he cannot urinate. We will discuss this in more detail at his next visit with me.   Thanks  SD    Future Appointments  3/13/2025  8:00 AM    [facility] N[facility]       [place]           [facility]   3/13/2025  2:00 PM    Hi Costa MD          [facility]           [facility]

## 2025-03-04 NOTE — ED PROVIDER NOTES
Patient Seen in: [facility] [place]      History     Chief Complaint   Patient presents with    Eval-G     Stated Complaint: taylor leaking    Subjective:   HPI      Patient is a 69-year-old male with history of Taylor catheter.  He had it placed for urinary retention about 3 weeks ago.  Complains of leaking around his penis around the Taylor catheter.  Was seen in the ED approximately 11 days ago with a catheter replaced.  Did notice some specks of blood but there is no clots or any retention.  No abdominal pain flank pain fever chills nausea vomiting.  He is set to see urology within the week for voiding trial.  No other associated symptoms or complaints.    Objective:     Past Medical History:    Esophageal reflux    Essential hypertension    Hyperlipidemia              History reviewed. No pertinent surgical history.             Social History     Socioeconomic History    Marital status:    Tobacco Use    Smoking status: Never    Smokeless tobacco: Never   Vaping Use    Vaping status: Never Used   Substance and Sexual Activity    Alcohol use: Never    Drug use: Never     Social Drivers of Health     Food Insecurity: Not at Risk (2024)    Received from [employer]    Food Insecurity     Food: 1   Transportation Needs: Not at Risk (2025)    Received from [facility]    Transportation Needs     Transportation: 1   Housing Stability: Not at Risk (2025)    Received from [employer]    Housing Stability     Housin                  Physical Exam     ED Triage Vitals [25 2243]   /71   Pulse 69   Resp 18   Temp 98.4 °F (36.9 °C)   Temp src Oral   SpO2 95 %   O2 Device None (Room air)       Current Vitals:   Vital Signs  BP: 117/71  Pulse: 69  Resp: 18  Temp: 98.4 °F (36.9 °C)  Temp src: Oral    Oxygen Therapy  SpO2: 95 %  O2 Device: None (Room air)        Physical Exam  Vitals and nursing note reviewed.   Constitutional:       General: He is not in acute distress.     Appearance:  He is well-developed. He is not toxic-appearing.   HENT:      Head: Normocephalic and atraumatic.   Eyes:      General: No scleral icterus.     Conjunctiva/sclera: Conjunctivae normal.   Cardiovascular:      Rate and Rhythm: Normal rate.   Pulmonary:      Effort: Pulmonary effort is normal. No respiratory distress.   Abdominal:      General: There is no distension.   Genitourinary:     Penis: Normal.    Musculoskeletal:         General: No tenderness. Normal range of motion.      Cervical back: Normal range of motion and neck supple.   Skin:     General: Skin is warm and dry.      Findings: No rash.   Neurological:      Mental Status: He is alert and oriented to person, place, and time.      Motor: No abnormal muscle tone.      Coordination: Coordination normal.   Psychiatric:         Behavior: Behavior normal.            ED Course     Labs Reviewed   URINALYSIS WITH CULTURE REFLEX - Abnormal; Notable for the following components:       Result Value    Clarity Urine Slightly Cloudy (*)     Ketones Urine Trace (*)     Blood Urine Large (*)     Protein Urine >=300 (*)     Urobilinogen Urine 2.0 (*)     Nitrite Urine Positive (*)     Leukocyte Esterase Urine Trace (*)     All other components within normal limits   UA MICROSCOPIC ONLY, URINE - Abnormal; Notable for the following components:    RBC Urine 6-10 (*)     Bacteria Urine Rare (*)     All other components within normal limits   ICTOTEST - Normal   URINE CULTURE, ROUTINE                    MDM              -History source other than patient -daughter        -Comorbidities did add complexity to the management are mentioned in the HPI above        -I personally reviewed the prior external notes and the medical record to obtain additional history reviewed last ED visit on February 20, 2020   Chan catheter was placed with 700 cc of urine obtained.        -DDX: Includes but not limited to UTI, urinary retention, Chan catheter malfunction which is a medical  condition that can pose a threat to life/function       Labs Reviewed   URINALYSIS WITH CULTURE REFLEX - Abnormal; Notable for the following components:       Result Value    Clarity Urine Slightly Cloudy (*)     Ketones Urine Trace (*)     Blood Urine Large (*)     Protein Urine >=300 (*)     Urobilinogen Urine 2.0 (*)     Nitrite Urine Positive (*)     Leukocyte Esterase Urine Trace (*)     All other components within normal limits   UA MICROSCOPIC ONLY, URINE - Abnormal; Notable for the following components:    RBC Urine 6-10 (*)     Bacteria Urine Rare (*)     All other components within normal limits   ICTOTEST - Normal   URINE CULTURE, ROUTINE     Urinalysis suggestive of UTI.  He did have some burning in the penis.  Will send the urine for culture.  Start him on Keflex.    We flushed and inflated the catheter but still continued leak so Chan catheter was switched out to a new 1.  He tolerated this well.  He will follow-up with Dr. Costa as an outpatient for voiding trial.  He demonstrates understanding well-appearing nontoxic discharged home stable condition.               Medical Decision Making      Disposition and Plan     Clinical Impression:  1. Malfunction of Chan catheter, initial encounter    2. Acute UTI         Disposition:  Discharge  3/4/2025 12:32 am    Follow-up:  Hi Costa MD  [address] [place] [address]  [phone]    Schedule an appointment as soon as possible for a visit            Medications Prescribed:  Current Discharge Medication List        START taking these medications    Details   cephALEXin 500 MG Oral Cap Take 1 capsule (500 mg total) by mouth 4 (four) times daily for 10 days.  Qty: 40 capsule, Refills: 0                 Supplementary Documentation:

## 2025-03-05 ENCOUNTER — TELEPHONE (OUTPATIENT)
Dept: SURGERY | Facility: CLINIC | Age: 70
End: 2025-03-05

## 2025-03-05 NOTE — TELEPHONE ENCOUNTER
Phoned Patient to discuss below.  No answer, left voice message to call [phone] ext. [phone].

## 2025-03-05 NOTE — TELEPHONE ENCOUNTER
Per daughter patient went to the emergency room, increased flomax 0.8 and has notices a few small blood clots in his urine. Please advise

## 2025-03-05 NOTE — TELEPHONE ENCOUNTER
Rec'd Patient's Daughter return call.  Daughter reports that Patient has increased Flomax 0.8 MG  per Dr. Costa recommendation LOV 2/20.  Reports that Patient has small stringy blood clots noted in taylor.   Denies burning, bladder pressure/pain. Taylor is freely flowing and cloudy urine noted. Patient currently taking Cephalexin 500 mg and hydrating little.   RN instructed Patient's Daughter to have PT.  drink 40-60 oz of water daily to keep the urine straw in color.  RN instructed Patient to go to ER if bleeding/clots worsen, fever, dysuria, and pain.   Daughter agreeable with plan.  To follow-up 3/13 for VT, PVR.  This Encounter is now closed.

## 2025-03-06 RX ORDER — SULFAMETHOXAZOLE AND TRIMETHOPRIM 800; 160 MG/1; MG/1
1 TABLET ORAL 2 TIMES DAILY
Qty: 20 TABLET | Refills: 0 | Status: SHIPPED | OUTPATIENT
Start: 2025-03-06 | End: 2025-03-16

## 2025-03-08 ENCOUNTER — HOSPITAL ENCOUNTER (OUTPATIENT)
Dept: MRI IMAGING | Facility: HOSPITAL | Age: 70
Discharge: HOME | End: 2025-03-08
Attending: UROLOGY
Payer: MEDICARE

## 2025-03-08 DIAGNOSIS — R97.20 ELEVATED PSA: ICD-10-CM

## 2025-03-08 PROCEDURE — 72197 MRI PELVIS W/O & W/DYE: CPT | Performed by: UROLOGY

## 2025-03-08 PROCEDURE — A9575 INJ GADOTERATE MEGLUMI 0.1ML: HCPCS | Performed by: UROLOGY

## 2025-03-08 RX ORDER — GADOTERATE MEGLUMINE 376.9 MG/ML
20 INJECTION INTRAVENOUS
Status: COMPLETED | OUTPATIENT
Start: 2025-03-08 | End: 2025-03-08

## 2025-03-08 RX ADMIN — GADOTERATE MEGLUMINE 18 ML: 376.9 INJECTION INTRAVENOUS at 11:07:00

## 2025-03-10 NOTE — ED NOTES
Spoke with patients daughter. Informed of resistant urine culture and need for new RX of bactrim. Daughter verbalizes understanding to stop the keflex and start the bactrim and then return to ED if worse or follow up with PCP.

## 2025-03-12 ENCOUNTER — TELEPHONE (OUTPATIENT)
Dept: SURGERY | Facility: CLINIC | Age: 70
End: 2025-03-12

## 2025-03-12 NOTE — TELEPHONE ENCOUNTER
RN phoned Patient's Daughter/Precious to confirm scheduled Nurse visit in am with return PVR and follow-up visit with Dr. Costa.  Daughter will be present for follow-up appt.  Pt's daughter agreeable with plan.  This Encounter is now closed.

## 2025-03-13 ENCOUNTER — NURSE ONLY (OUTPATIENT)
Facility: LOCATION | Age: 70
End: 2025-03-13
Payer: MEDICARE

## 2025-03-13 ENCOUNTER — OFFICE VISIT (OUTPATIENT)
Facility: LOCATION | Age: 70
End: 2025-03-13
Payer: MEDICARE

## 2025-03-13 DIAGNOSIS — R97.20 ELEVATED PSA: Primary | ICD-10-CM

## 2025-03-13 PROCEDURE — 99215 OFFICE O/P EST HI 40 MIN: CPT | Performed by: UROLOGY

## 2025-03-13 PROCEDURE — G2211 COMPLEX E/M VISIT ADD ON: HCPCS | Performed by: UROLOGY

## 2025-03-13 PROCEDURE — 1160F RVW MEDS BY RX/DR IN RCRD: CPT | Performed by: UROLOGY

## 2025-03-13 PROCEDURE — 1159F MED LIST DOCD IN RCRD: CPT | Performed by: UROLOGY

## 2025-03-13 PROCEDURE — 51798 US URINE CAPACITY MEASURE: CPT | Performed by: UROLOGY

## 2025-03-13 NOTE — PROGRESS NOTES
Urology Clinic Note    Primary Care Provider:  Rosie Salgado APRN     Chief Complaint:   Urinary retention, elevated PSA    HPI:      Dean Domingo is a 69 year old male with history of GERD, hypertension, CAD on aspirin 81 only (error in chart), hyperlipidemia referred for urinary retention.   offered, patient did not want.  Daughter here with patient today     Patient has no urologic history aside from possible prostatitis in 2013.  He presented to the emergency room on 2/11/2025 with urinary retention for 1 day.  He had abdominal pain at that time.  He had a catheter placed.  He apparently had over 1 L drained.  Urine was not consistent with infection but did have some microscopic hematuria.  No imaging was done at that time, he was discharged with catheter.     He then saw for evaluation.  Recent labs reviewed, recent A1c was 6.3.  Patient has been on Flomax since discharge.   At baseline, patient reports he reports slightly slowed urinary stream over the last few years.  Again, has never previously seen urology.  He is currently tolerating Chan fine.  His bowels are regular.  No previous history of retention.  No recent medication changes.  No recent travel.      PSA was done at OSH, 4/29/24- 5.3.   At last visit, plan was to recheck a PSA in the future.  Discussed that he may need MRI in the future to work this up.  For his urinary retention, we were to discuss adding finasteride.  A CT scan was ordered.  This was not done.  Flomax was refilled.  He was to return for a void trial and cystoscopy     Void trial in 2/20/2025.  Same day, cystoscopy showed moderate trilobar hypertrophy with obstructive lateral and intravesical lobes.  High bladder neck.  At that visit, CT scan was planned.  He did not want to learn CIC.  Repeat PSA was ordered for 1 week after procedure.  He was then to return for further discussion.  Of note, he had significant constipation.  He was therefore started on  MiraLAX.    CT scan was done on 2025.  This showed a large prostate, on my review about 150 g.  No bladder lesions.  No lesions of the kidneys.  Repeat PSA was done on 2025.  This was 6.15.  MRI of the prostate was ordered.  This was done on 3/8/2025 but the read has not yet resulted.  In the emergency room on 3/3/2025 with issues with catheter drainage.  Catheter was exchanged.  Urine was positive for Enterobacter, he has been on antibiotics for this.  Family wanted to trial void again.  Catheter was removed this morning.  Has voided 3 times since his discharge.  His PVR is 180.  He feels comfortable.  Bowel movements are improved but still not at baseline.      PSA:  Lab Results   Component Value Date    PSA 6.15 (H) 2025        History:     Past Medical History:    Esophageal reflux    Essential hypertension    Hyperlipidemia       No past surgical history on file.    No family history on file.    Social History     Socioeconomic History    Marital status:    Tobacco Use    Smoking status: Never    Smokeless tobacco: Never   Vaping Use    Vaping status: Never Used   Substance and Sexual Activity    Alcohol use: Never    Drug use: Never     Social Drivers of Health     Food Insecurity: Not at Risk (2024)    Received from [employer]    Food Insecurity     Food: 1   Transportation Needs: Not at Risk (2025)    Received from [employer]    Transportation Needs     Transportation: 1   Stress: Not at Risk (2025)    Received from [employer] [facility]    Stress     Stress: 1   Housing Stability: Not at Risk (2025)    Received from [employer]    Housing Stability     Housin       Medications (Active prior to today's visit):  Current Outpatient Medications   Medication Sig Dispense Refill    sulfamethoxazole-trimethoprim -160 MG Oral Tab per tablet Take 1 tablet by mouth 2 (two) times daily for 10 days. 20 tablet 0    cephALEXin 500 MG Oral Cap Take 1 capsule (500 mg total) by mouth 4 (four) times daily  for 10 days. 40 capsule 0    tamsulosin 0.4 MG Oral Cap Take 2 capsules (0.8 mg total) by mouth every evening. 60 capsule 2    finasteride 5 MG Oral Tab Take 1 tablet (5 mg total) by mouth daily. 90 tablet 6    metFORMIN HCl ER, OSM, 500 MG (OSM) Oral Tablet 24 Hr Take 1 tablet (500 mg total) by mouth daily with breakfast.      Diclofenac Potassium 50 MG Oral Tab Take 1 tablet (50 mg total) by mouth 2 (two) times daily.      baclofen 10 MG Oral Tab Take 1 tablet (10 mg total) by mouth 2 (two) times daily.      Esomeprazole Magnesium 40 MG Oral Capsule Delayed Release Take 1 capsule (40 mg total) by mouth every morning before breakfast.      aspirin 325 MG Oral Tab EC Take 1 tablet (325 mg total) by mouth every 6 (six) hours as needed for Pain.      atenolol 50 MG Oral Tab Take 1 tablet (50 mg total) by mouth 2 (two) times daily.      Rosuvastatin Calcium 10 MG Oral Tab Take 1 tablet (10 mg total) by mouth nightly.      lisinopril 20 MG Oral Tab Take 1 tablet (20 mg total) by mouth daily.         Allergies:  Allergies[1]    Review of Systems:   A comprehensive 10-point review of systems was completed.  Pertinent positives and negatives are noted in the the HPI.    Physical Exam:   CONSTITUTIONAL: Well developed, well nourished, in no acute distress  ABDOMEN: Soft, non-tender, non-distended     Assessment & Plan:   Retention  Likely due to BPH, exacerbated by constipation.  Void trial completed today, has voided several times.  Much improved from prior per patient.  Continue double voiding.  Continue Flomax twice daily.  Continue finasteride.  ER precautions given.  Patient likely needs a bladder outlet obstruction procedure given his LUTS, history of retention.  I have given him information for HoLEP providers given massive prostatomegaly on imaging.  He will consider this pending results of MRI as noted below.    Elevated PSA  PSA was rechecked after his last procedure and remains elevated.  Reassuring PSA density  given massive prostate.  Follow-up final results of MRI (on my review, no obvious lesions but will wait for radiology review).  If lesions are noted, plan for biopsy.  If no lesion noted may proceed with HoLEP as noted above.  Extensively discussed today with family.        In total, 40 minutes were spent on this patient encounter (including chart review, patient history, physical, and counseling, documentation, and communication).    Hi Costa MD  Staff Urologist  [facility]  Office: [phone]         [1] No Known Allergies

## 2025-03-13 NOTE — PROGRESS NOTES
Verified the patient's name, date of birth and his reason for the visit. Steps of the procedure explained. Safely assisted patient to the exam table and put him in a comfortable position. While in a standing position, RN asked lower his pants and undergarments. Blue chux under his buttocks. Draped the private area.  Penile area observed to be clean and pink. No redness or swelling.     Donned gloves. Removed the straps and the leg bag. Urine was yellow and no sedimentation observed.    Chux on patient's thigh. With the empty syringe, deflated the balloon of his entire content of 10cc. Chan removed slowly encouraging the patient to breath slowly. Leg bag was also removed, disposed it with the chux and syringe. Denies pain.    Assisted patient to comfortable position and helped him pull his pants and undergarments. Washed hands    Provided education and instructions to monitor urination. Patient instructed to push fluids (40-60 oz  daily)  Pt. To return this afternoon for PVR check and follow-up with Dr. Costa.  Patient agreeable with plan.

## 2025-03-14 ENCOUNTER — HOSPITAL ENCOUNTER (EMERGENCY)
Age: 70
Discharge: HOME | End: 2025-03-14
Attending: EMERGENCY MEDICINE
Payer: MEDICARE

## 2025-03-14 ENCOUNTER — TELEPHONE (OUTPATIENT)
Dept: SURGERY | Facility: CLINIC | Age: 70
End: 2025-03-14

## 2025-03-14 VITALS
WEIGHT: 193 LBS | TEMPERATURE: 99 F | OXYGEN SATURATION: 98 % | BODY MASS INDEX: 29.25 KG/M2 | HEIGHT: 68 IN | HEART RATE: 65 BPM | RESPIRATION RATE: 18 BRPM | DIASTOLIC BLOOD PRESSURE: 90 MMHG | SYSTOLIC BLOOD PRESSURE: 164 MMHG

## 2025-03-14 DIAGNOSIS — R33.9 URINARY RETENTION: Primary | ICD-10-CM

## 2025-03-14 PROCEDURE — 99283 EMERGENCY DEPT VISIT LOW MDM: CPT

## 2025-03-14 PROCEDURE — 51702 INSERT TEMP BLADDER CATH: CPT

## 2025-03-14 PROCEDURE — 51798 US URINE CAPACITY MEASURE: CPT

## 2025-03-14 PROCEDURE — 99284 EMERGENCY DEPT VISIT MOD MDM: CPT

## 2025-03-14 RX ORDER — LIDOCAINE HYDROCHLORIDE 20 MG/ML
JELLY TOPICAL
Status: COMPLETED
Start: 2025-03-14 | End: 2025-03-14

## 2025-03-14 RX ORDER — LIDOCAINE HYDROCHLORIDE 20 MG/ML
5 JELLY TOPICAL ONCE
Status: COMPLETED | OUTPATIENT
Start: 2025-03-14 | End: 2025-03-14

## 2025-03-14 NOTE — TELEPHONE ENCOUNTER
Called daughter.  Discussed MRI findings.  Discussed options in detail.  Given PI-RADS 2 findings, favorable PSA density we will proceed with bladder outlet obstruction procedure.  Of note, patient had a void trial yesterday and was back in the emergency room overnight with a catheter being replaced.  He is now doing well.  He will call to make an appointment with HoLEP provider as discussed yesterday.  All questions answered.    Nurses, please make nurse appointment in 1 month for catheter exchange.  Thanks.

## 2025-03-14 NOTE — TELEPHONE ENCOUNTER
Phoned Patient's daughter to schedule Nurse visit for Taylor Catheter change.  Rn Scheduled Patient for monthly taylor catheter change.  This Encounter is now closed.

## 2025-03-14 NOTE — ED INITIAL ASSESSMENT (HPI)
Pt to ed with c/o urinary retention since midnight, pt had catheter removed yesterday after placement 1 month ago, PT see's Dr Costa urology (downstairs)  PT currently being treated fro UTI

## 2025-03-14 NOTE — ED PROVIDER NOTES
Patient Seen in: [facility] In [place]      History     Chief Complaint   Patient presents with    Urinary Symptoms     Stated Complaint: unable to void, catheter removed yesterday    Subjective:   HPI      This is a 69-year-old male past medical history of GERD, hypertension, hyperlipidemia, BPH on Flomax , urinary retention.  Patient had his Chan catheter removed yesterday after being indwelling for 1 month and is now unable to void since midnight.  Denies any fever chills nausea or vomiting.  He is currently on antibiotics for previous UTI.  He presents here with his daughter.  Declined .    Objective:     Past Medical History:    Esophageal reflux    Essential hypertension    Hyperlipidemia              History reviewed. No pertinent surgical history.             Social History     Socioeconomic History    Marital status:    Tobacco Use    Smoking status: Never    Smokeless tobacco: Never   Vaping Use    Vaping status: Never Used   Substance and Sexual Activity    Alcohol use: Never    Drug use: Never     Social Drivers of Health     Food Insecurity: Not at Risk (2024)    Received from [employer]    Food Insecurity     Food: 1   Transportation Needs: Not at Risk (2025)    Received from [facility]    Transportation Needs     Transportation: 1   Housing Stability: Not at Risk (2025)    Received from [employer]    Housing Stability     Housin                  Physical Exam     ED Triage Vitals [25 0729]   BP (!) 164/90   Pulse 65   Resp 18   Temp 98.7 °F (37.1 °C)   Temp src Oral   SpO2 98 %   O2 Device None (Room air)       Current Vitals:   Vital Signs  BP: (!) 164/90  Pulse: 65  Resp: 18  Temp: 98.7 °F (37.1 °C)  Temp src: Oral    Oxygen Therapy  SpO2: 98 %  O2 Device: None (Room air)        Physical Exam  GENERAL: Awake, alert oriented x3, nontoxic appearing.   SKIN: Normal, warm, and dry.  HEENT:  Pupils equally round and reactive to light. Conjuctiva clear.   Oropharynx is clear and moist.   Lungs: Clear to auscultation bilaterally with no rales, no retractions, and no wheezing.  HEART:  Regular rate and rhythm. S1 and S2. No murmurs, no rubs or gallops.   ABDOMEN: Soft, nontender and nondistended. Normoactive bowel sounds. No rebound. No guarding.   : Bladder palpable suprapubic region.  EXTREMITIES: Warm with brisk capillary refill.         ED Course   Labs Reviewed - No data to display                MDM              This is a 69-year-old male past medical history of GERD, hypertension, hyperlipidemia, BPH on Flomax , urinary retention.  Patient had his Chan catheter removed yesterday after being indwelling for 1 month and is now unable to void since midnight.       [address] Chan catheter was reinserted.  Return of clear yellow urine.    Patient is to continue regular medications.  He is on baclofen, cephalexin, Flomax.,  Proscar.    Case discussed with Dr. Costa.  He states will contact the family for follow-up.  This was communicated to daughter.    Patient discharged home in good condition.    Reviewed urology note from 3/13/2025    Assessment & Plan:   Retention  Likely due to BPH, exacerbated by constipation.  Void trial completed today, has voided several times.  Much improved from prior per patient.  Continue double voiding.  Continue Flomax twice daily.  Continue finasteride.  ER precautions given.  Patient likely needs a bladder outlet obstruction procedure given his LUTS, history of retention.  I have given him information for HoLEP providers given massive prostatomegaly on imaging.  He will consider this pending results of MRI as noted below.     Elevated PSA  PSA was rechecked after his last procedure and remains elevated.  Reassuring PSA density given massive prostate.  Follow-up final results of MRI (on my review, no obvious lesions but will wait for radiology review).  If lesions are noted, plan for biopsy.  If no lesion noted may proceed with  HoLEP as noted above.  Extensively discussed today with family.           In total, 40 minutes were spent on this patient encounter (including chart review, patient history, physical, and counseling, documentation, and communication).     Hi Costa MD  Staff Urologist    Disposition and Plan     Clinical Impression:  1. Urinary retention         Disposition:  There is no disposition on file for this visit.  There is no disposition time on file for this visit.    Follow-up:  Hi Costa MD  [address] [place] [address]  [phone]    Follow up            Medications Prescribed:  Current Discharge Medication List              Supplementary Documentation:

## 2025-03-17 NOTE — TELEPHONE ENCOUNTER
RN Phoned Patient's Daughter/Precious to confirm Nurse appt. For monthly catheter change of 4/10/25 [place].  Patient daughter agreeable with plan.  This Encounter is now closed.'

## 2025-03-18 ENCOUNTER — TELEPHONE (OUTPATIENT)
Dept: SURGERY | Facility: CLINIC | Age: 70
End: 2025-03-18

## 2025-03-18 NOTE — TELEPHONE ENCOUNTER
Phoned Patient to discuss below.  No answer, left voice message to call [phone] ext. [phone].

## 2025-03-18 NOTE — TELEPHONE ENCOUNTER
Per daughter the surgeon patient was referred to is not in his insurance network and requesting another referral. Please advise

## 2025-03-19 NOTE — TELEPHONE ENCOUNTER
Phoned Patient's Daughter/Elysiaiana to discuss below.  RN instructed Patients daughter to [facility] advise of HOLEP in network providers surgical procedure.  Patient's daughter agreeable with plan.  This Encounter is now closed.

## 2025-03-24 ENCOUNTER — TELEPHONE (OUTPATIENT)
Dept: SURGERY | Facility: CLINIC | Age: 70
End: 2025-03-24

## 2025-03-24 NOTE — TELEPHONE ENCOUNTER
Patient daughter calling would like to know if Dr Hernandez performs Holep procedure?Please advise

## 2025-03-24 NOTE — TELEPHONE ENCOUNTER
This encounter is now closed.     RN called daughter, Katelyn. No answer. Left message, Dr Hernandez or Dr Costa do not perform HOLEP.     Patient of Dr Costa  Last seen on 3/13/25

## 2025-03-31 ENCOUNTER — TELEPHONE (OUTPATIENT)
Dept: SURGERY | Facility: CLINIC | Age: 70
End: 2025-03-31

## 2025-03-31 NOTE — TELEPHONE ENCOUNTER
Pt's daughter called requesting to speak to the nurse regarding a procedure pt needs.  Please call

## 2025-03-31 NOTE — TELEPHONE ENCOUNTER
RN returned Patient's Daughter Lliana call to discuss finding recommended HoLep Procedure Surgeon.  Patient's daughter advised if the Green Light Laser Procedure\"  the same as the Holep.  RN messaged Dr. Costa for clarification of Procedure.

## 2025-03-31 NOTE — TELEPHONE ENCOUNTER
Phoned Patient to discuss below.  No answer, left voice message to call [phone] ext. [phone].

## 2025-04-08 ENCOUNTER — TELEPHONE (OUTPATIENT)
Dept: SURGERY | Facility: CLINIC | Age: 70
End: 2025-04-08

## 2025-04-08 NOTE — TELEPHONE ENCOUNTER
Per daughter stating that [facility] Dr Jemima Caal only received the requisition but not the actual referral.Please advise     FAX NUMBER [phone]

## 2025-04-08 NOTE — TELEPHONE ENCOUNTER
Referral faxed to number provided   Received fax confirmation   Called [facility] and informed referral faxed. Verbalized understanding     This encounter is now closed

## 2025-04-10 ENCOUNTER — NURSE ONLY (OUTPATIENT)
Facility: LOCATION | Age: 70
End: 2025-04-10
Payer: MEDICARE

## 2025-04-10 PROCEDURE — 51705 CHANGE OF BLADDER TUBE: CPT | Performed by: UROLOGY

## 2025-04-10 NOTE — PROGRESS NOTES
Received patient for his monthly catheter change.       Steps of the procedure explained. He verbalized understanding and agreeable to plans. Put him in a comfortable position. Lowered his pants and undergarments, blue chux on his upper thigh. Genital area observed to clean and dry.      Donned gloves. Chux on patient's thigh. Untangled the straps. Urine on the bag is yellow and no sedimentation observed. With the empty syringe, deflated the balloon of his entire content of 10cc. Removed 16Fr Chan along with his leg bag. Chux and syringes were disposed accordingly. Tolerated the removal of Chan.      Sterile field started. Applied sterile gloves. The penial area was wiped with Betadine and administered 18ml Lidocaine as well. Patient verbalized understanding. 16Fr Coude was inserted successfully. Denied pain. Inflated the balloon with 10ml of sterile water. Flow of yellow urine noted. Stat Lock was applied to his left upper thigh giving enough slack on the tubing from the tip of penis to the thigh. Leg bag applied to his left  lower leg and straps adjusted accordingly.  Given stat lock supply at discharge. No questions at this time,     Patient to follow up monthly for this cathter change.

## 2025-04-18 ENCOUNTER — HOSPITAL ENCOUNTER (EMERGENCY)
Age: 70
Discharge: HOME | End: 2025-04-18
Attending: EMERGENCY MEDICINE
Payer: MEDICARE

## 2025-04-18 VITALS
TEMPERATURE: 99 F | DIASTOLIC BLOOD PRESSURE: 77 MMHG | WEIGHT: 185 LBS | BODY MASS INDEX: 28.04 KG/M2 | HEIGHT: 68 IN | OXYGEN SATURATION: 99 % | HEART RATE: 83 BPM | SYSTOLIC BLOOD PRESSURE: 122 MMHG | RESPIRATION RATE: 16 BRPM

## 2025-04-18 DIAGNOSIS — N39.0 URINARY TRACT INFECTION ASSOCIATED WITH INDWELLING URETHRAL CATHETER, INITIAL ENCOUNTER: Primary | ICD-10-CM

## 2025-04-18 DIAGNOSIS — T83.511A URINARY TRACT INFECTION ASSOCIATED WITH INDWELLING URETHRAL CATHETER, INITIAL ENCOUNTER: Primary | ICD-10-CM

## 2025-04-18 LAB
ALBUMIN SERPL-MCNC: 4.4 G/DL (ref 3.2–4.8)
ALBUMIN/GLOB SERPL: 1.3 {RATIO} (ref 1–2)
ALP LIVER SERPL-CCNC: 70 U/L (ref 45–117)
ALT SERPL-CCNC: 14 U/L (ref 10–49)
ANION GAP SERPL CALC-SCNC: 8 MMOL/L (ref 0–18)
AST SERPL-CCNC: 11 U/L (ref ?–34)
BASOPHILS # BLD AUTO: 0.01 X10(3) UL (ref 0–0.2)
BASOPHILS NFR BLD AUTO: 0.1 %
BILIRUB SERPL-MCNC: 0.5 MG/DL (ref 0.2–1.1)
BILIRUB UR QL STRIP.AUTO: NEGATIVE
BUN BLD-MCNC: 27 MG/DL (ref 9–23)
CALCIUM BLD-MCNC: 9.6 MG/DL (ref 8.7–10.6)
CHLORIDE SERPL-SCNC: 102 MMOL/L (ref 98–112)
CO2 SERPL-SCNC: 24 MMOL/L (ref 21–32)
COLOR UR AUTO: YELLOW
CREAT BLD-MCNC: 1.12 MG/DL (ref 0.7–1.3)
EGFRCR SERPLBLD CKD-EPI 2021: 70 ML/MIN/1.73M2 (ref 60–?)
EOSINOPHIL # BLD AUTO: 0 X10(3) UL (ref 0–0.7)
EOSINOPHIL NFR BLD AUTO: 0 %
ERYTHROCYTE [DISTWIDTH] IN BLOOD BY AUTOMATED COUNT: 14.7 %
GLOBULIN PLAS-MCNC: 3.4 G/DL (ref 2–3.5)
GLUCOSE BLD-MCNC: 154 MG/DL (ref 70–99)
GLUCOSE BLD-MCNC: 155 MG/DL (ref 70–99)
GLUCOSE UR STRIP.AUTO-MCNC: NEGATIVE MG/DL
HCT VFR BLD AUTO: 36.9 % (ref 39–53)
HGB BLD-MCNC: 12.8 G/DL (ref 13–17.5)
IMM GRANULOCYTES # BLD AUTO: 0.09 X10(3) UL (ref 0–1)
IMM GRANULOCYTES NFR BLD: 0.9 %
KETONES UR STRIP.AUTO-MCNC: 15 MG/DL
LYMPHOCYTES # BLD AUTO: 0.52 X10(3) UL (ref 1–4)
LYMPHOCYTES NFR BLD AUTO: 4.9 %
MCH RBC QN AUTO: 31.7 PG (ref 26–34)
MCHC RBC AUTO-ENTMCNC: 34.7 G/DL (ref 31–37)
MCV RBC AUTO: 91.3 FL (ref 80–100)
MONOCYTES # BLD AUTO: 0.55 X10(3) UL (ref 0.1–1)
MONOCYTES NFR BLD AUTO: 5.2 %
NEUTROPHILS # BLD AUTO: 9.37 X10 (3) UL (ref 1.5–7.7)
NEUTROPHILS # BLD AUTO: 9.37 X10(3) UL (ref 1.5–7.7)
NEUTROPHILS NFR BLD AUTO: 88.9 %
NITRITE UR QL STRIP.AUTO: POSITIVE
OSMOLALITY SERPL CALC.SUM OF ELEC: 286 MOSM/KG (ref 275–295)
PH UR STRIP.AUTO: 6 [PH] (ref 5–8)
PLATELET # BLD AUTO: 116 10(3)UL (ref 150–450)
PLATELETS.RETICULATED NFR BLD AUTO: 6.1 % (ref 0–7)
POTASSIUM SERPL-SCNC: 3.9 MMOL/L (ref 3.5–5.1)
PROT SERPL-MCNC: 7.8 G/DL (ref 5.7–8.2)
RBC # BLD AUTO: 4.04 X10(6)UL (ref 3.8–5.8)
SODIUM SERPL-SCNC: 134 MMOL/L (ref 136–145)
SP GR UR STRIP.AUTO: 1.02 (ref 1–1.03)
UROBILINOGEN UR STRIP.AUTO-MCNC: 2 MG/DL
WBC # BLD AUTO: 10.5 X10(3) UL (ref 4–11)
WBC #/AREA URNS AUTO: >50 /HPF

## 2025-04-18 PROCEDURE — 96365 THER/PROPH/DIAG IV INF INIT: CPT

## 2025-04-18 PROCEDURE — 81001 URINALYSIS AUTO W/SCOPE: CPT | Performed by: EMERGENCY MEDICINE

## 2025-04-18 PROCEDURE — 87040 BLOOD CULTURE FOR BACTERIA: CPT | Performed by: EMERGENCY MEDICINE

## 2025-04-18 PROCEDURE — 87086 URINE CULTURE/COLONY COUNT: CPT | Performed by: EMERGENCY MEDICINE

## 2025-04-18 PROCEDURE — 82962 GLUCOSE BLOOD TEST: CPT

## 2025-04-18 PROCEDURE — 80053 COMPREHEN METABOLIC PANEL: CPT | Performed by: EMERGENCY MEDICINE

## 2025-04-18 PROCEDURE — 36415 COLL VENOUS BLD VENIPUNCTURE: CPT

## 2025-04-18 PROCEDURE — 85025 COMPLETE CBC W/AUTO DIFF WBC: CPT | Performed by: EMERGENCY MEDICINE

## 2025-04-18 PROCEDURE — 87088 URINE BACTERIA CULTURE: CPT | Performed by: EMERGENCY MEDICINE

## 2025-04-18 PROCEDURE — 99284 EMERGENCY DEPT VISIT MOD MDM: CPT

## 2025-04-18 PROCEDURE — 96361 HYDRATE IV INFUSION ADD-ON: CPT

## 2025-04-18 PROCEDURE — 81015 MICROSCOPIC EXAM OF URINE: CPT | Performed by: EMERGENCY MEDICINE

## 2025-04-18 PROCEDURE — 87186 SC STD MICRODIL/AGAR DIL: CPT | Performed by: EMERGENCY MEDICINE

## 2025-04-18 PROCEDURE — 51702 INSERT TEMP BLADDER CATH: CPT

## 2025-04-18 RX ORDER — SULFAMETHOXAZOLE AND TRIMETHOPRIM 800; 160 MG/1; MG/1
1 TABLET ORAL 2 TIMES DAILY
Qty: 14 TABLET | Refills: 0 | Status: SHIPPED | OUTPATIENT
Start: 2025-04-18 | End: 2025-04-25

## 2025-04-18 NOTE — ED INITIAL ASSESSMENT (HPI)
Chills for 3 days on and off, left side abdom discomfort, has taylor cath 1.5 months for urine retention, saw Primary today who said has pus in the urine bag, no vomiting

## 2025-04-18 NOTE — ED QUICK NOTES
Leg bag applied, pt tolerated. Chan drainage well, Son remains with patient. Patient remains alert, oriented, up wit steady gait. Pt Verbalized understanding of discharge instructions.

## 2025-04-18 NOTE — ED QUICK NOTES
Old taylor PTA was removed and replaced with 16 Fr taylor catheter. Pus at urethra noted, cleansed well prior to taylor insertion. Patient tolerated well. Taylor draining well. Patient stable at this time. Daughter at bedside.

## 2025-04-18 NOTE — ED PROVIDER NOTES
Patient Seen in: ward Emergency Department In [place]      History     Chief Complaint   Patient presents with    Abdomen/Flank Pain    Urinary Symptoms     Stated Complaint: chills, pus in taylor, weak    Subjective:   HPI    69-year-old male presents to the emergency department for evaluation of \"pus\" in his Taylor catheter.  Patient has an indwelling Taylor catheter that was changed out last week.  Over the last couple days he states he had some chills and decreased appetite.  No vomiting.  He is diabetic and is unsure what his sugar is to be is been compliant with his medications.  They went to their primary care physician who advised that he get some baseline blood work and to be certain that he was not septic.  As well as having a urinalysis.  History of Present Illness               Objective:     Past Medical History:    Esophageal reflux    Essential hypertension    Hyperlipidemia              History reviewed. No pertinent surgical history.             Social History     Socioeconomic History    Marital status:    Tobacco Use    Smoking status: Never    Smokeless tobacco: Never   Vaping Use    Vaping status: Never Used   Substance and Sexual Activity    Alcohol use: Never    Drug use: Never     Social Drivers of Health     Food Insecurity: Not at Risk (2024)    Received from [employer]    Food Insecurity     Food: 1   Transportation Needs: Not at Risk (2025)    Received from [employer]    Transportation Needs     Transportation: 1   Housing Stability: Not at Risk (2025)    Received from [employer]    Housing Stability     Housin                                Physical Exam     ED Triage Vitals [25 1058]   /71   Pulse 94   Resp 16   Temp 98 °F (36.7 °C)   Temp src Temporal   SpO2 95 %   O2 Device None (Room air)       Current Vitals:   Vital Signs  BP: 122/77  Pulse: 80  Resp: 24  Temp: 99 °F (37.2 °C)  Temp src: Oral    Oxygen Therapy  SpO2: 100 %  O2 Device: None (Room  air)        Physical Exam  Vitals and nursing note reviewed. Chaperone present: Son-in-law in room and daughter on phone assisting with history.   Constitutional:       General: He is not in acute distress.     Appearance: Normal appearance. He is well-developed. He is not toxic-appearing.   HENT:      Head: Normocephalic and atraumatic.   Cardiovascular:      Rate and Rhythm: Normal rate and regular rhythm.      Pulses: Normal pulses.      Heart sounds: Normal heart sounds.   Pulmonary:      Effort: Pulmonary effort is normal.      Breath sounds: Normal breath sounds. No stridor.   Abdominal:      General: Bowel sounds are normal.      Palpations: Abdomen is soft.      Tenderness: There is no right CVA tenderness or left CVA tenderness.   Musculoskeletal:         General: Normal range of motion.      Cervical back: Normal range of motion and neck supple.   Lymphadenopathy:      Cervical: No cervical adenopathy.   Skin:     General: Skin is warm and dry.   Neurological:      General: No focal deficit present.      Mental Status: He is alert and oriented to person, place, and time.          Physical Exam                ED Course     Labs Reviewed   COMP METABOLIC PANEL (14) - Abnormal; Notable for the following components:       Result Value    Glucose 155 (*)     Sodium 134 (*)     BUN 27 (*)     All other components within normal limits   CBC WITH DIFFERENTIAL WITH PLATELET - Abnormal; Notable for the following components:    HGB 12.8 (*)     HCT 36.9 (*)     .0 (*)     Neutrophil Absolute Prelim 9.37 (*)     Neutrophil Absolute 9.37 (*)     Lymphocyte Absolute 0.52 (*)     All other components within normal limits   URINALYSIS WITH CULTURE REFLEX - Abnormal; Notable for the following components:    Clarity Urine Cloudy (*)     Ketones Urine 15 (*)     Blood Urine Large (*)     Protein Urine 100 mg/dL (*)     Urobilinogen Urine 2.0 (*)     Nitrite Urine Positive (*)     Leukocyte Esterase Urine Large (*)      All other components within normal limits   UA MICROSCOPIC ONLY, URINE - Abnormal; Notable for the following components:    WBC Urine >50 (*)     Bacteria Urine 3+ (*)     All other components within normal limits   POCT GLUCOSE - Abnormal; Notable for the following components:    POC Glucose 154 (*)     All other components within normal limits   SCAN SLIDE   RAINBOW DRAW BLUE   BLOOD CULTURE   BLOOD CULTURE   URINE CULTURE, ROUTINE          Results                                 MDM      Patient had IV established and blood work obtained.  He had a urinalysis that was obtained after changing out his Chan.  He has greater than 50 WBCs with 3+ bacteria consistent with a urinary tract infection.  Patient does not have an elevated white blood cell count and is afebrile here.  He was given fluids.  Currently his sugar is slightly elevated but he is a known diabetic on metformin.  He was evaluated after receiving fluids today and he is feeling much better.  He was given a dose of Rocephin and blood cultures were obtained.  I reviewed his previous urine cultures and also spoke with his daughter again and he has tolerated Bactrim in the past and it appeared that his recent sensitivities was sensitive to Bactrim and therefore he will be initiated on that for home.  He is to push fluids.  Tylenol or ibuprofen as needed.  He is to return should he have any sudden worsening.  He will be contacted if he requires a change in antibiotics.  Patient was discharged in good condition        Medical Decision Making      Disposition and Plan     Clinical Impression:  1. Urinary tract infection associated with indwelling urethral catheter, initial encounter         Disposition:  Discharge  4/18/2025  2:13 pm    Follow-up:  Rosie Salgado, APRN  [address] [facility] [address]  [phone]    Schedule an appointment as soon as possible for a visit            Medications Prescribed:  Current Discharge Medication List           Supplementary Documentation:

## 2025-04-20 RX ORDER — CEPHALEXIN 500 MG/1
500 CAPSULE ORAL 2 TIMES DAILY
Qty: 14 CAPSULE | Refills: 0 | Status: SHIPPED | OUTPATIENT
Start: 2025-04-20 | End: 2025-04-27

## 2025-04-23 ENCOUNTER — TELEPHONE (OUTPATIENT)
Dept: SURGERY | Facility: CLINIC | Age: 70
End: 2025-04-23

## 2025-04-23 NOTE — TELEPHONE ENCOUNTER
Last PSA, MRI prostate and CT Abd+Pelvis kidney stone results faxed to number provided   Received fax confirmation     This encounter is now closed

## 2025-04-23 NOTE — TELEPHONE ENCOUNTER
Per daughter patient currently at office of Dr. Caal needs last PSA results and imaging reports faxed to [phone]. Thank you.

## 2025-05-28 RX ORDER — TAMSULOSIN HYDROCHLORIDE 0.4 MG/1
0.8 CAPSULE ORAL EVERY EVENING
Qty: 180 CAPSULE | Refills: 3 | Status: SHIPPED | OUTPATIENT
Start: 2025-05-28

## 2025-05-30 ENCOUNTER — HOSPITAL ENCOUNTER (EMERGENCY)
Age: 70
Discharge: HOME | End: 2025-05-31
Attending: EMERGENCY MEDICINE
Payer: MEDICARE

## 2025-05-30 VITALS
RESPIRATION RATE: 18 BRPM | WEIGHT: 192 LBS | TEMPERATURE: 99 F | SYSTOLIC BLOOD PRESSURE: 125 MMHG | DIASTOLIC BLOOD PRESSURE: 76 MMHG | HEART RATE: 60 BPM | BODY MASS INDEX: 29.1 KG/M2 | HEIGHT: 68 IN | OXYGEN SATURATION: 98 %

## 2025-05-30 DIAGNOSIS — N39.0 URINARY TRACT INFECTION WITHOUT HEMATURIA, SITE UNSPECIFIED: Primary | ICD-10-CM

## 2025-05-30 LAB
BILIRUB UR QL STRIP.AUTO: NEGATIVE
COLOR UR AUTO: YELLOW
GLUCOSE UR STRIP.AUTO-MCNC: NEGATIVE MG/DL
KETONES UR STRIP.AUTO-MCNC: NEGATIVE MG/DL
NITRITE UR QL STRIP.AUTO: NEGATIVE
PH UR STRIP.AUTO: 5.5 [PH] (ref 5–8)
RBC #/AREA URNS AUTO: >10 /HPF
RBC #/AREA URNS AUTO: >10 /HPF
SP GR UR STRIP.AUTO: 1.02 (ref 1–1.03)
UROBILINOGEN UR STRIP.AUTO-MCNC: 0.2 MG/DL
WBC #/AREA URNS AUTO: >50 /HPF
WBC #/AREA URNS AUTO: >50 /HPF

## 2025-05-30 PROCEDURE — 87077 CULTURE AEROBIC IDENTIFY: CPT | Performed by: EMERGENCY MEDICINE

## 2025-05-30 PROCEDURE — 51702 INSERT TEMP BLADDER CATH: CPT

## 2025-05-30 PROCEDURE — 87186 SC STD MICRODIL/AGAR DIL: CPT | Performed by: EMERGENCY MEDICINE

## 2025-05-30 PROCEDURE — 87086 URINE CULTURE/COLONY COUNT: CPT | Performed by: EMERGENCY MEDICINE

## 2025-05-30 PROCEDURE — 99284 EMERGENCY DEPT VISIT MOD MDM: CPT

## 2025-05-30 PROCEDURE — 81015 MICROSCOPIC EXAM OF URINE: CPT | Performed by: EMERGENCY MEDICINE

## 2025-05-30 PROCEDURE — 81001 URINALYSIS AUTO W/SCOPE: CPT | Performed by: EMERGENCY MEDICINE

## 2025-05-30 RX ORDER — ASPIRIN 81 MG/1
TABLET, CHEWABLE ORAL DAILY
COMMUNITY

## 2025-05-30 RX ORDER — CEPHALEXIN 500 MG/1
500 CAPSULE ORAL 4 TIMES DAILY
Qty: 28 CAPSULE | Refills: 0 | Status: SHIPPED | OUTPATIENT
Start: 2025-05-30 | End: 2025-06-06

## 2025-05-30 RX ORDER — LIDOCAINE HYDROCHLORIDE 20 MG/ML
5 JELLY TOPICAL ONCE
Status: COMPLETED | OUTPATIENT
Start: 2025-05-30 | End: 2025-05-30

## 2025-05-31 NOTE — ED PROVIDER NOTES
Patient Seen in: ward Emergency Department In [place]        History  Chief Complaint   Patient presents with    Urinary Symptoms     Stated Complaint: dysuria, dark urine. has taylor cath    Subjective:   HPI            Patient is 69-year-old male presents to ED for evaluation of dysuria, dark yellow-colored urine.  Patient apparently has chronic indwelling Taylor due to BPH since February.  Taylor changed about 3 weeks ago and schedule be changed in 5 days.  Complains of some burning to the tip of his penis.  No fevers or chills.  No other complaints      Objective:     Past Medical History:    Esophageal reflux    Essential hypertension    Hyperlipidemia    Prostate disease              History reviewed. No pertinent surgical history.             Social History     Socioeconomic History    Marital status:    Tobacco Use    Smoking status: Never    Smokeless tobacco: Never   Vaping Use    Vaping status: Never Used   Substance and Sexual Activity    Alcohol use: Never    Drug use: Never     Social Drivers of Health     Food Insecurity: Not at Risk (2024)    Received from [employer]    Food Insecurity     Food: 1   Transportation Needs: Not at Risk (2025)    Received from [employer]    Transportation Needs     Transportation: 1   Housing Stability: Not at Risk (2025)    Received from [employer]    Housing Stability     Housin                                Physical Exam    ED Triage Vitals [25]   /73   Pulse 68   Resp 18   Temp 98.5 °F (36.9 °C)   Temp src Temporal   SpO2 98 %   O2 Device None (Room air)       Current Vitals:   Vital Signs  BP: 125/76  Pulse: 60  Resp: 18  Temp: 98.5 °F (36.9 °C)  Temp src: Temporal    Oxygen Therapy  SpO2: 98 %  O2 Device: None (Room air)            Physical Exam  CONSTITUTIONAL: Well appearing, in no acute distress  LUNGS: Clear to auscultation bilaterally  CARDIOVASCULAR: Regular rate and rhythm, normal S1-S2  ABDOMINAL: Soft, nondistended,  nontender  SKIN: Warm and dry  Genitourinary: Chan catheter is in place.  Yellow urine in Chan        ED Course  Labs Reviewed   URINALYSIS, ROUTINE - Abnormal; Notable for the following components:       Result Value    Clarity Urine Cloudy (*)     Blood Urine Large (*)     Protein Urine 100 mg/dL (*)     Leukocyte Esterase Urine Moderate (*)     WBC Urine >50 (*)     RBC Urine >10 (*)     Bacteria Urine 1+ (*)     Squamous Epi. Cells Few (*)     All other components within normal limits   UA MICROSCOPIC ONLY, URINE - Abnormal; Notable for the following components:    WBC Urine >50 (*)     RBC Urine >10 (*)     Bacteria Urine 1+ (*)     Squamous Epi. Cells Few (*)     All other components within normal limits   URINE CULTURE, ROUTINE                            MDM     Patient is a 69-year-old male presents to ED for evaluation of dysuria.  Differential penile irritation secondary to Chan catheter, urinary tract infection.  Chan catheter was exchanged and urinalysis performed showing potential urinary tract infection.  Patient will be treated with antibiotics.  After Chan catheter was performed.  Urine was blood-tinged with a slight clot.  I believe potential traumatic Chan insertion.  Continuous bladder irrigation not indicated.  Patient will be  sent home on Keflex.    Patient was screened and evaluated during this visit.   As a treating physician attending to the patient, I determined, within reasonable clinical confidence and prior to discharge, that an emergency medical condition was not or was no longer present.  There was no indication for further evaluation, treatment or admission on an emergency basis.  Comprehensive verbal and written discharge and follow-up instructions were provided to help prevent relapse or worsening.  Patient was instructed to follow-up with her primary care provider for further evaluation and treatment, but to return immediately to the ER for worsening, concerning, new,  changing or persisting symptoms.  I discussed the case with the patient and they had no questions, complaints, or concerns.  Patient felt comfortable going home.          MDM    Disposition and Plan     Clinical Impression:  1. Urinary tract infection without hematuria, site unspecified         Disposition:  Discharge  5/30/2025 11:54 pm    Follow-up:  Rosie Salgado, APRN  [address] [facility] [address]  [phone]    Follow up  As needed          Medications Prescribed:  Discharge Medication List as of 5/31/2025 12:00 AM        START taking these medications    Details   cephALEXin 500 MG Oral Cap Take 1 capsule (500 mg total) by mouth 4 (four) times daily for 7 days., Normal, Disp-28 capsule, R-0                   Supplementary Documentation:

## 2025-05-31 NOTE — ED INITIAL ASSESSMENT (HPI)
Pt to ED with dysuria and dark urine that started today, pt has taylor cath that was changed 3 weeks ago. Denies fevers/chills.

## 2025-07-19 ENCOUNTER — HOSPITAL ENCOUNTER (EMERGENCY)
Age: 70
Discharge: HOME | End: 2025-07-19
Attending: EMERGENCY MEDICINE
Payer: MEDICARE

## 2025-07-19 VITALS
WEIGHT: 190 LBS | DIASTOLIC BLOOD PRESSURE: 76 MMHG | SYSTOLIC BLOOD PRESSURE: 133 MMHG | OXYGEN SATURATION: 97 % | HEART RATE: 89 BPM | HEIGHT: 68 IN | BODY MASS INDEX: 28.79 KG/M2 | TEMPERATURE: 98 F | RESPIRATION RATE: 17 BRPM

## 2025-07-19 DIAGNOSIS — T83.9XXA PROBLEM WITH FOLEY CATHETER, INITIAL ENCOUNTER: Primary | ICD-10-CM

## 2025-07-19 DIAGNOSIS — N30.90 CYSTITIS: ICD-10-CM

## 2025-07-19 LAB
ANION GAP SERPL CALC-SCNC: 9 MMOL/L (ref 0–18)
BASOPHILS # BLD AUTO: 0.02 X10(3) UL (ref 0–0.2)
BASOPHILS NFR BLD AUTO: 0.3 %
BILIRUB UR QL STRIP.AUTO: NEGATIVE
BUN BLD-MCNC: 12 MG/DL (ref 9–23)
CALCIUM BLD-MCNC: 9.3 MG/DL (ref 8.7–10.6)
CHLORIDE SERPL-SCNC: 105 MMOL/L (ref 98–112)
CO2 SERPL-SCNC: 22 MMOL/L (ref 21–32)
COLOR UR AUTO: YELLOW
CREAT BLD-MCNC: 0.96 MG/DL (ref 0.7–1.3)
EGFRCR SERPLBLD CKD-EPI 2021: 85 ML/MIN/1.73M2 (ref 60–?)
EOSINOPHIL # BLD AUTO: 0.01 X10(3) UL (ref 0–0.7)
EOSINOPHIL NFR BLD AUTO: 0.1 %
ERYTHROCYTE [DISTWIDTH] IN BLOOD BY AUTOMATED COUNT: 14.1 %
GLUCOSE BLD-MCNC: 142 MG/DL (ref 70–99)
GLUCOSE UR STRIP.AUTO-MCNC: 100 MG/DL
HCT VFR BLD AUTO: 35.7 % (ref 39–53)
HGB BLD-MCNC: 12.6 G/DL (ref 13–17.5)
IMM GRANULOCYTES # BLD AUTO: 0.04 X10(3) UL (ref 0–1)
IMM GRANULOCYTES NFR BLD: 0.5 %
LYMPHOCYTES # BLD AUTO: 0.87 X10(3) UL (ref 1–4)
LYMPHOCYTES NFR BLD AUTO: 10.9 %
MCH RBC QN AUTO: 32.1 PG (ref 26–34)
MCHC RBC AUTO-ENTMCNC: 35.3 G/DL (ref 31–37)
MCV RBC AUTO: 90.8 FL (ref 80–100)
MONOCYTES # BLD AUTO: 0.44 X10(3) UL (ref 0.1–1)
MONOCYTES NFR BLD AUTO: 5.5 %
NEUTROPHILS # BLD AUTO: 6.57 X10 (3) UL (ref 1.5–7.7)
NEUTROPHILS # BLD AUTO: 6.57 X10(3) UL (ref 1.5–7.7)
NEUTROPHILS NFR BLD AUTO: 82.7 %
NITRITE UR QL STRIP.AUTO: POSITIVE
OSMOLALITY SERPL CALC.SUM OF ELEC: 284 MOSM/KG (ref 275–295)
PH UR STRIP.AUTO: 5.5 (ref 5–8)
PLATELET # BLD AUTO: 121 10(3)UL (ref 150–450)
PLATELETS.RETICULATED NFR BLD AUTO: 3.7 % (ref 0–7)
POTASSIUM SERPL-SCNC: 3.8 MMOL/L (ref 3.5–5.1)
RBC # BLD AUTO: 3.93 X10(6)UL (ref 3.8–5.8)
RBC #/AREA URNS AUTO: >10 /HPF
SODIUM SERPL-SCNC: 136 MMOL/L (ref 136–145)
SP GR UR STRIP.AUTO: 1.02 (ref 1–1.03)
UROBILINOGEN UR STRIP.AUTO-MCNC: 0.2 MG/DL
WBC # BLD AUTO: 8 X10(3) UL (ref 4–11)

## 2025-07-19 PROCEDURE — 81001 URINALYSIS AUTO W/SCOPE: CPT | Performed by: PHYSICIAN ASSISTANT

## 2025-07-19 PROCEDURE — 85025 COMPLETE CBC W/AUTO DIFF WBC: CPT | Performed by: EMERGENCY MEDICINE

## 2025-07-19 PROCEDURE — 99284 EMERGENCY DEPT VISIT MOD MDM: CPT

## 2025-07-19 PROCEDURE — 81015 MICROSCOPIC EXAM OF URINE: CPT | Performed by: PHYSICIAN ASSISTANT

## 2025-07-19 PROCEDURE — 87086 URINE CULTURE/COLONY COUNT: CPT | Performed by: PHYSICIAN ASSISTANT

## 2025-07-19 PROCEDURE — 51702 INSERT TEMP BLADDER CATH: CPT

## 2025-07-19 PROCEDURE — 87077 CULTURE AEROBIC IDENTIFY: CPT | Performed by: PHYSICIAN ASSISTANT

## 2025-07-19 PROCEDURE — 87186 SC STD MICRODIL/AGAR DIL: CPT | Performed by: PHYSICIAN ASSISTANT

## 2025-07-19 PROCEDURE — 36415 COLL VENOUS BLD VENIPUNCTURE: CPT

## 2025-07-19 PROCEDURE — 99283 EMERGENCY DEPT VISIT LOW MDM: CPT

## 2025-07-19 PROCEDURE — 80048 BASIC METABOLIC PNL TOTAL CA: CPT | Performed by: EMERGENCY MEDICINE

## 2025-07-19 RX ORDER — LIDOCAINE HYDROCHLORIDE 20 MG/ML
5 JELLY TOPICAL ONCE
Status: COMPLETED | OUTPATIENT
Start: 2025-07-19 | End: 2025-07-19

## 2025-07-19 RX ORDER — CEPHALEXIN 500 MG/1
500 CAPSULE ORAL 3 TIMES DAILY
Qty: 21 CAPSULE | Refills: 0 | Status: SHIPPED | OUTPATIENT
Start: 2025-07-19 | End: 2025-07-26

## 2025-07-19 NOTE — DISCHARGE INSTRUCTIONS
Follow-up with your urologist as scheduled.  Start new oral antibiotic.  If urine culture dictates further treatment or change in therapy you will be contacted.

## 2025-07-19 NOTE — ED INITIAL ASSESSMENT (HPI)
Pt reports his taylor catheter is barely draining. Reports the last normal urine output was around 0600. Reports he does notice small drips into the bag.

## 2025-07-19 NOTE — ED PROVIDER NOTES
Patient Seen in: [facility] In [place]        History  Chief Complaint   Patient presents with    Cath Tube Problem     Stated Complaint: Chan clogged, abd pain    Subjective:   HPI            69-year-old gentleman.  Medical history of benign prostatic hyperplasia.  Scheduled for surgical intervention at the end of this month.  Indwelling Chan since February,  Last changed 2 days prior to arrival.  Since last night or this morning, feels the Chan is not draining properly.  Has had some leaking around the catheter and is experiencing episodic sharp pain.  No fever, chills or malaise.      Objective:     Past Medical History:    Esophageal reflux    Essential hypertension    Hyperlipidemia    Prostate disease              History reviewed. No pertinent surgical history.             Social History     Socioeconomic History    Marital status:    Tobacco Use    Smoking status: Never    Smokeless tobacco: Never   Vaping Use    Vaping status: Never Used   Substance and Sexual Activity    Alcohol use: Never    Drug use: Never     Social Drivers of Health     Food Insecurity: Not at Risk (9/6/2024)    Received from [employer]    Food Insecurity     Within the past 12 months, you worried that your food would run out before you got money to buy more.: 1   Transportation Needs: Not at Risk (2/13/2025)    Received from [employer]    Transportation Needs     In the past 12 months, has lack of transportation kept you from medical appointments, meetings, work or from getting things needed for daily living?: 1   Housing Stability: Not at Risk (2/13/2025)    Received from [employer]    Housing Stability     What is your living situation today? : 1                                Physical Exam    ED Triage Vitals [07/19/25 1350]   /76   Pulse 89   Resp 17   Temp 98.2 °F (36.8 °C)   Temp src    SpO2 97 %   O2 Device None (Room air)       Current Vitals:   Vital Signs  BP: 133/76  Pulse: 89  Resp: 17  Temp: 98.2 °F  (36.8 °C)    Oxygen Therapy  SpO2: 97 %  O2 Device: None (Room air)            Physical Exam       Gen: Well appearing, well groomed, alert and aware x 3  Neck: Supple, full range of motion, no thyromegaly or lymphadenopathy.  Abdominal: Soft exam without distention.  No pain to palpation all 4 quadrants. No organomegaly.  Normal bowel sounds.  Back: Full active range of motion.  No CVA tenderness bilaterally.  Lung: No distress, RR, no retraction, breath sounds are clear bilaterally  Cardio: Regular rate and rhythm, normal S1-S2, no murmur appreciable    ED Course  Labs Reviewed   URINALYSIS, ROUTINE - Abnormal; Notable for the following components:       Result Value    Clarity Urine Slightly Cloudy (*)     Glucose Urine 100 (*)     Ketones Urine Trace (*)     Blood Urine Large (*)     Protein Urine 30 mg/dL (*)     Nitrite Urine Positive (*)     Leukocyte Esterase Urine Small (*)     All other components within normal limits   BASIC METABOLIC PANEL (8) - Abnormal; Notable for the following components:    Glucose 142 (*)     All other components within normal limits   CBC WITH DIFFERENTIAL WITH PLATELET - Abnormal; Notable for the following components:    HGB 12.6 (*)     HCT 35.7 (*)     .0 (*)     Lymphocyte Absolute 0.87 (*)     All other components within normal limits   UA MICROSCOPIC ONLY, URINE - Abnormal; Notable for the following components:    WBC Urine 11-20 (*)     RBC Urine >10 (*)     Bacteria Urine 3+ (*)     Squamous Epi. Cells Few (*)     All other components within normal limits   URINE CULTURE, ROUTINE                       MDM     My supervising physician was involved in the management of this patient.    Nontoxic appearing male.  Afebrile.  No tachycardia.  Mild suprapubic discomfort with occasional sharp pain.  No fever, chills or malaise    CBC demonstrates no leukocytosis.  Hemoglobin 12.6    Glucose 142.  Normal renal function    Uro-Jet.  We will replace the Chan catheter.  We  will test urine from the newly applied Chan    Small leukocytes, positive nitrites, large blood    Microscopic demonstrates 11-20 WBCs  3+ bacteria we will send for culture    Follow-up with your urologist as scheduled.  Start new oral antibiotic.  If urine culture dictates further treatment or change in therapy you will be contacted.    Medical Decision Making      Disposition and Plan     Clinical Impression:  1. Problem with Chan catheter, initial encounter    2. Cystitis         Disposition:  There is no disposition on file for this visit.  There is no disposition time on file for this visit.    Follow-up:  Rosie Salgado, APRN  [address] [facility] [address]  [phone]    Follow up            Medications Prescribed:  Current Discharge Medication List                Supplementary Documentation:

## 2025-07-19 NOTE — ED PROVIDER NOTES
Patient Seen in: [facility] In [place]        History  Chief Complaint   Patient presents with    Cath Tube Problem     Stated Complaint: Chan clogged, abd pain    Subjective:   HPI            Patient with a history of prostate issues and Chan catheter.  Patient had a Chan catheter changed on Thursday.  Patient reports Chan catheter has not been draining since about 6 AM.  Patient has suprapubic abdominal pain and pressure and the urge to void but nothing is draining from the catheter.  No fever.  No flank pain.  No vomiting.  No chest pain or shortness of breath.      Objective:     Past Medical History:    Esophageal reflux    Essential hypertension    Hyperlipidemia    Prostate disease              History reviewed. No pertinent surgical history.             Social History     Socioeconomic History    Marital status:    Tobacco Use    Smoking status: Never    Smokeless tobacco: Never   Vaping Use    Vaping status: Never Used   Substance and Sexual Activity    Alcohol use: Never    Drug use: Never     Social Drivers of Health     Food Insecurity: Not at Risk (9/6/2024)    Received from [facility]    Food Insecurity     Within the past 12 months, you worried that your food would run out before you got money to buy more.: 1   Transportation Needs: Not at Risk (2/13/2025)    Received from [facility]    Transportation Needs     In the past 12 months, has lack of transportation kept you from medical appointments, meetings, work or from getting things needed for daily living?: 1   Housing Stability: Not at Risk (2/13/2025)    Received from [facility]    Housing Stability     What is your living situation today? : 1                                Physical Exam    ED Triage Vitals [07/19/25 1350]   /76   Pulse 89   Resp 17   Temp 98.2 °F (36.8 °C)   Temp src    SpO2 97 %   O2 Device None (Room air)       Current Vitals:   Vital Signs  BP: 133/76  Pulse: 89  Resp: 17  Temp: 98.2 °F (36.8  °C)    Oxygen Therapy  SpO2: 97 %  O2 Device: None (Room air)            Physical Exam  General: The patient is awake, alert, conversant.   Eyes: sclera white, conjunctiva pink and moist.  Lids and lashes are normal.  Abdomen: Full across the suprapubic abdomen with diffuse tenderness here.  Upper abdomen is benign.  No CVA tenderness  There is no male external genitalia.  Chan catheter is in place.  No extraordinary urethral ulcerations are noted.  Chan catheter is not draining  Extremities: Unremarkable.  Calves nonswollen, symmetric, nontender.  No pedal edema.  Skin: Not particularly pale  Neurologic:  Mental status as above.  Patient moves all extremities with good strength and coordination.            ED Course  Labs Reviewed   URINALYSIS, ROUTINE - Abnormal; Notable for the following components:       Result Value    Clarity Urine Slightly Cloudy (*)     Glucose Urine 100 (*)     Ketones Urine Trace (*)     Blood Urine Large (*)     Protein Urine 30 mg/dL (*)     Nitrite Urine Positive (*)     Leukocyte Esterase Urine Small (*)     All other components within normal limits   BASIC METABOLIC PANEL (8) - Abnormal; Notable for the following components:    Glucose 142 (*)     All other components within normal limits   CBC WITH DIFFERENTIAL WITH PLATELET - Abnormal; Notable for the following components:    HGB 12.6 (*)     HCT 35.7 (*)     .0 (*)     Lymphocyte Absolute 0.87 (*)     All other components within normal limits   UA MICROSCOPIC ONLY, URINE - Abnormal; Notable for the following components:    WBC Urine 11-20 (*)     RBC Urine >10 (*)     Bacteria Urine 3+ (*)     Squamous Epi. Cells Few (*)     All other components within normal limits   URINE CULTURE, ROUTINE                            MDM     Patient with malfunctioning Chan catheter.  I recommend Chan catheter be replaced.   Patient reports that the catheter exchanges have gone well previously  Patient denies any infectious symptoms such  as fever, chills, or flank pain to suggest pyelonephritis  I will recheck patient's kidney function    CBC shows normal white count with mild anemia and mild thrombocytopenia requiring follow-up  Metabolic panel shows normal electrolytes and creatinine  Urinalysis positive nitrates, leukocytes, and 11-20 WBCs with 3+ bacteria in the fresh Chan catheter specimen  Urine culture ordered    In reviewing medical records, I note a urine culture from May  URINE CULTURE 50,000-99,000 CFU/ML Escherichia coli Abnormal         Resulting Agency: [facility] ([facility])     Susceptibility     Escherichia coli     Not Specified    Ampicillin 4 Sensitive    Cefazolin <=4 Sensitive    Ciprofloxacin <=0.25 Sensitive    Gentamicin <=1 Sensitive    Levofloxacin <=0.12 Sensitive    Meropenem <=0.25 Sensitive    Nitrofurantoin <=16 Sensitive    Piperacillin + Tazobactam <=4 Sensitive    Trimethoprim/Sulfa >=320 Resistant                  Patient discharged on Keflex antibiotic which showed sensitivity previously.  Recommend fluids, rest, and close follow-up with his urologist and/or primary care doctor for reexamination and culture check          Medical Decision Making      Disposition and Plan     Clinical Impression:  1. Problem with Chan catheter, initial encounter    2. Cystitis         Disposition:  Discharge  7/19/2025  3:50 pm    Follow-up:  Rosie Salgado, APRN  [address] [facility] [address]  [phone]    Follow up            Medications Prescribed:  Discharge Medication List as of 7/19/2025  3:52 PM                Supplementary Documentation:

## (undated) NOTE — ED AVS SNAPSHOT
Verito Gomezvaleria   MRN: [id number]    Department:  [facility] in [place]   Date of Visit:  9/7/2019           Disclosure     Insurance plans vary and the physician(s) referred by the ER may not be covered by your plan.  Please contact yo tell this physician (or your personal doctor if your instructions are to return to your personal doctor) about any new or lasting problems. The primary care or specialist physician will see patients referred from the [facility] Emergency Department.  Gus Romero

## (undated) NOTE — ED AVS SNAPSHOT
Josue Rivero   MRN: [id number]    Department:  [facility] in [place]   Date of Visit:  1/12/2018           Disclosure     Insurance plans vary and the physician(s) referred by the ER may not be covered by your plan.  Please contact y tell this physician (or your personal doctor if your instructions are to return to your personal doctor) about any new or lasting problems. The primary care or specialist physician will see patients referred from the [facility] Emergency Department.  Alvarez Price